# Patient Record
Sex: MALE | Race: WHITE | Employment: UNEMPLOYED | URBAN - METROPOLITAN AREA
[De-identification: names, ages, dates, MRNs, and addresses within clinical notes are randomized per-mention and may not be internally consistent; named-entity substitution may affect disease eponyms.]

---

## 2020-06-20 ENCOUNTER — HOSPITAL ENCOUNTER (INPATIENT)
Age: 40
LOS: 1 days | Discharge: HOME | DRG: 194 | End: 2020-06-22
Attending: EMERGENCY MEDICINE | Admitting: HOSPITALIST
Payer: MEDICAID

## 2020-06-20 ENCOUNTER — APPOINTMENT (OUTPATIENT)
Dept: GENERAL RADIOLOGY | Age: 40
DRG: 194 | End: 2020-06-20
Attending: EMERGENCY MEDICINE
Payer: MEDICAID

## 2020-06-20 DIAGNOSIS — L03.115 CELLULITIS OF BOTH LOWER EXTREMITIES: ICD-10-CM

## 2020-06-20 DIAGNOSIS — Z72.0 TOBACCO ABUSE: ICD-10-CM

## 2020-06-20 DIAGNOSIS — L03.116 CELLULITIS OF BOTH LOWER EXTREMITIES: ICD-10-CM

## 2020-06-20 DIAGNOSIS — I50.9 ACUTE ON CHRONIC CONGESTIVE HEART FAILURE, UNSPECIFIED HEART FAILURE TYPE (HCC): Primary | ICD-10-CM

## 2020-06-20 DIAGNOSIS — Z91.199 HISTORY OF NONCOMPLIANCE WITH MEDICAL TREATMENT: ICD-10-CM

## 2020-06-20 DIAGNOSIS — R60.9 PERIPHERAL EDEMA: ICD-10-CM

## 2020-06-20 DIAGNOSIS — J90 PLEURAL EFFUSION: ICD-10-CM

## 2020-06-20 DIAGNOSIS — Z91.199 MEDICAL NON-COMPLIANCE: ICD-10-CM

## 2020-06-20 DIAGNOSIS — F15.10 METHAMPHETAMINE ABUSE (HCC): ICD-10-CM

## 2020-06-20 DIAGNOSIS — I50.21 ACUTE SYSTOLIC CHF (CONGESTIVE HEART FAILURE) (HCC): ICD-10-CM

## 2020-06-20 LAB
ALBUMIN SERPL-MCNC: 2.9 G/DL (ref 3.5–5)
ALBUMIN/GLOB SERPL: 0.6 {RATIO} (ref 1.2–3.5)
ALP SERPL-CCNC: 166 U/L (ref 50–136)
ALT SERPL-CCNC: 14 U/L (ref 12–65)
ANION GAP SERPL CALC-SCNC: 8 MMOL/L (ref 7–16)
AST SERPL-CCNC: 25 U/L (ref 15–37)
BASOPHILS # BLD: 0.1 K/UL (ref 0–0.2)
BASOPHILS NFR BLD: 1 % (ref 0–2)
BILIRUB SERPL-MCNC: 1.9 MG/DL (ref 0.2–1.1)
BNP SERPL-MCNC: 8814 PG/ML (ref 5–125)
BUN SERPL-MCNC: 15 MG/DL (ref 6–23)
CALCIUM SERPL-MCNC: 8.6 MG/DL (ref 8.3–10.4)
CHLORIDE SERPL-SCNC: 105 MMOL/L (ref 98–107)
CO2 SERPL-SCNC: 27 MMOL/L (ref 21–32)
CREAT SERPL-MCNC: 1.22 MG/DL (ref 0.8–1.5)
DIFFERENTIAL METHOD BLD: ABNORMAL
EOSINOPHIL # BLD: 0 K/UL (ref 0–0.8)
EOSINOPHIL NFR BLD: 0 % (ref 0.5–7.8)
ERYTHROCYTE [DISTWIDTH] IN BLOOD BY AUTOMATED COUNT: 18.5 % (ref 11.9–14.6)
GLOBULIN SER CALC-MCNC: 4.6 G/DL (ref 2.3–3.5)
GLUCOSE SERPL-MCNC: 95 MG/DL (ref 65–100)
HCT VFR BLD AUTO: 42.9 % (ref 41.1–50.3)
HGB BLD-MCNC: 13.2 G/DL (ref 13.6–17.2)
IMM GRANULOCYTES # BLD AUTO: 0 K/UL (ref 0–0.5)
IMM GRANULOCYTES NFR BLD AUTO: 0 % (ref 0–5)
LYMPHOCYTES # BLD: 1.6 K/UL (ref 0.5–4.6)
LYMPHOCYTES NFR BLD: 22 % (ref 13–44)
MAGNESIUM SERPL-MCNC: 1.8 MG/DL (ref 1.8–2.4)
MCH RBC QN AUTO: 27.8 PG (ref 26.1–32.9)
MCHC RBC AUTO-ENTMCNC: 30.8 G/DL (ref 31.4–35)
MCV RBC AUTO: 90.3 FL (ref 79.6–97.8)
MONOCYTES # BLD: 0.8 K/UL (ref 0.1–1.3)
MONOCYTES NFR BLD: 11 % (ref 4–12)
NEUTS SEG # BLD: 4.7 K/UL (ref 1.7–8.2)
NEUTS SEG NFR BLD: 65 % (ref 43–78)
NRBC # BLD: 0 K/UL (ref 0–0.2)
PLATELET # BLD AUTO: 209 K/UL (ref 150–450)
PMV BLD AUTO: 10.1 FL (ref 9.4–12.3)
POTASSIUM SERPL-SCNC: 3.7 MMOL/L (ref 3.5–5.1)
PROT SERPL-MCNC: 7.5 G/DL (ref 6.3–8.2)
RBC # BLD AUTO: 4.75 M/UL (ref 4.23–5.6)
SODIUM SERPL-SCNC: 140 MMOL/L (ref 136–145)
WBC # BLD AUTO: 7.2 K/UL (ref 4.3–11.1)

## 2020-06-20 PROCEDURE — 74011250636 HC RX REV CODE- 250/636: Performed by: EMERGENCY MEDICINE

## 2020-06-20 PROCEDURE — 71045 X-RAY EXAM CHEST 1 VIEW: CPT

## 2020-06-20 PROCEDURE — 80053 COMPREHEN METABOLIC PANEL: CPT

## 2020-06-20 PROCEDURE — 83735 ASSAY OF MAGNESIUM: CPT

## 2020-06-20 PROCEDURE — 99285 EMERGENCY DEPT VISIT HI MDM: CPT

## 2020-06-20 PROCEDURE — 83880 ASSAY OF NATRIURETIC PEPTIDE: CPT

## 2020-06-20 PROCEDURE — 83605 ASSAY OF LACTIC ACID: CPT

## 2020-06-20 PROCEDURE — 85025 COMPLETE CBC W/AUTO DIFF WBC: CPT

## 2020-06-20 PROCEDURE — 93005 ELECTROCARDIOGRAM TRACING: CPT | Performed by: EMERGENCY MEDICINE

## 2020-06-20 PROCEDURE — 84145 PROCALCITONIN (PCT): CPT

## 2020-06-20 RX ORDER — FUROSEMIDE 10 MG/ML
80 INJECTION INTRAMUSCULAR; INTRAVENOUS
Status: COMPLETED | OUTPATIENT
Start: 2020-06-20 | End: 2020-06-20

## 2020-06-20 RX ADMIN — FUROSEMIDE 80 MG: 10 INJECTION, SOLUTION INTRAMUSCULAR; INTRAVENOUS at 23:53

## 2020-06-21 PROBLEM — F15.10 METHAMPHETAMINE ABUSE (HCC): Status: ACTIVE | Noted: 2020-06-21

## 2020-06-21 PROBLEM — L03.115 CELLULITIS OF BOTH LOWER EXTREMITIES: Status: ACTIVE | Noted: 2020-06-21

## 2020-06-21 PROBLEM — Z72.0 TOBACCO ABUSE: Status: ACTIVE | Noted: 2020-06-21

## 2020-06-21 PROBLEM — L03.116 CELLULITIS OF BOTH LOWER EXTREMITIES: Status: ACTIVE | Noted: 2020-06-21

## 2020-06-21 PROBLEM — B18.2 HEPATITIS C, CHRONIC (HCC): Status: ACTIVE | Noted: 2020-06-21

## 2020-06-21 PROBLEM — I10 HTN (HYPERTENSION), MALIGNANT: Status: ACTIVE | Noted: 2020-06-21

## 2020-06-21 PROBLEM — J90 PLEURAL EFFUSION: Status: ACTIVE | Noted: 2020-06-21

## 2020-06-21 PROBLEM — I50.21 ACUTE SYSTOLIC CHF (CONGESTIVE HEART FAILURE) (HCC): Status: ACTIVE | Noted: 2020-06-21

## 2020-06-21 PROBLEM — L03.311 CELLULITIS OF ABDOMINAL WALL: Status: ACTIVE | Noted: 2020-06-21

## 2020-06-21 PROBLEM — R60.9 PERIPHERAL EDEMA: Status: ACTIVE | Noted: 2020-06-21

## 2020-06-21 PROBLEM — Z91.199 MEDICAL NON-COMPLIANCE: Status: ACTIVE | Noted: 2020-06-21

## 2020-06-21 LAB
AMPHET UR QL SCN: POSITIVE
APPEARANCE UR: CLEAR
ATRIAL RATE: 127 BPM
BACTERIA SPEC CULT: ABNORMAL
BACTERIA SPEC CULT: ABNORMAL
BACTERIA URNS QL MICRO: 0 /HPF
BARBITURATES UR QL SCN: NEGATIVE
BENZODIAZ UR QL: POSITIVE
BILIRUB UR QL: NEGATIVE
CALCULATED P AXIS, ECG09: 115 DEGREES
CALCULATED R AXIS, ECG10: 114 DEGREES
CALCULATED T AXIS, ECG11: -47 DEGREES
CANNABINOIDS UR QL SCN: NEGATIVE
CASTS URNS QL MICRO: ABNORMAL /LPF
COCAINE UR QL SCN: NEGATIVE
COLOR UR: YELLOW
CRP SERPL-MCNC: 2.1 MG/DL (ref 0–0.9)
DIAGNOSIS, 93000: NORMAL
EPI CELLS #/AREA URNS HPF: 0 /HPF
GLUCOSE UR STRIP.AUTO-MCNC: NEGATIVE MG/DL
HGB UR QL STRIP: NEGATIVE
KETONES UR QL STRIP.AUTO: NEGATIVE MG/DL
LACTATE SERPL-SCNC: 3 MMOL/L (ref 0.4–2)
LEUKOCYTE ESTERASE UR QL STRIP.AUTO: NEGATIVE
METHADONE UR QL: NEGATIVE
NITRITE UR QL STRIP.AUTO: NEGATIVE
OPIATES UR QL: NEGATIVE
P-R INTERVAL, ECG05: 148 MS
PCP UR QL: NEGATIVE
PH UR STRIP: 6 [PH] (ref 5–9)
PROCALCITONIN SERPL-MCNC: <0.05 NG/ML
PROT UR STRIP-MCNC: ABNORMAL MG/DL
Q-T INTERVAL, ECG07: 308 MS
QRS DURATION, ECG06: 82 MS
QTC CALCULATION (BEZET), ECG08: 435 MS
RBC #/AREA URNS HPF: ABNORMAL /HPF
SERVICE CMNT-IMP: ABNORMAL
SP GR UR REFRACTOMETRY: 1.01 (ref 1–1.02)
UROBILINOGEN UR QL STRIP.AUTO: 1 EU/DL (ref 0.2–1)
VENTRICULAR RATE, ECG03: 120 BPM
WBC URNS QL MICRO: ABNORMAL /HPF

## 2020-06-21 PROCEDURE — 80307 DRUG TEST PRSMV CHEM ANLYZR: CPT

## 2020-06-21 PROCEDURE — 87040 BLOOD CULTURE FOR BACTERIA: CPT

## 2020-06-21 PROCEDURE — 99254 IP/OBS CNSLTJ NEW/EST MOD 60: CPT | Performed by: INTERNAL MEDICINE

## 2020-06-21 PROCEDURE — 87641 MR-STAPH DNA AMP PROBE: CPT

## 2020-06-21 PROCEDURE — 74011250636 HC RX REV CODE- 250/636: Performed by: HOSPITALIST

## 2020-06-21 PROCEDURE — 99223 1ST HOSP IP/OBS HIGH 75: CPT | Performed by: INTERNAL MEDICINE

## 2020-06-21 PROCEDURE — 65660000000 HC RM CCU STEPDOWN

## 2020-06-21 PROCEDURE — 36415 COLL VENOUS BLD VENIPUNCTURE: CPT

## 2020-06-21 PROCEDURE — 74011250637 HC RX REV CODE- 250/637: Performed by: HOSPITALIST

## 2020-06-21 PROCEDURE — 81003 URINALYSIS AUTO W/O SCOPE: CPT

## 2020-06-21 PROCEDURE — 86140 C-REACTIVE PROTEIN: CPT

## 2020-06-21 RX ORDER — METOPROLOL SUCCINATE 25 MG/1
25 TABLET, EXTENDED RELEASE ORAL DAILY
Status: DISCONTINUED | OUTPATIENT
Start: 2020-06-21 | End: 2020-06-22 | Stop reason: HOSPADM

## 2020-06-21 RX ORDER — GUAIFENESIN 100 MG/5ML
81 LIQUID (ML) ORAL DAILY
Status: DISCONTINUED | OUTPATIENT
Start: 2020-06-21 | End: 2020-06-22

## 2020-06-21 RX ORDER — HYDRALAZINE HYDROCHLORIDE 20 MG/ML
20 INJECTION INTRAMUSCULAR; INTRAVENOUS ONCE
Status: ACTIVE | OUTPATIENT
Start: 2020-06-21 | End: 2020-06-21

## 2020-06-21 RX ORDER — AMIODARONE HYDROCHLORIDE 200 MG/1
200 TABLET ORAL DAILY
Status: DISCONTINUED | OUTPATIENT
Start: 2020-06-21 | End: 2020-06-22 | Stop reason: HOSPADM

## 2020-06-21 RX ORDER — HEPARIN SODIUM 5000 [USP'U]/ML
5000 INJECTION, SOLUTION INTRAVENOUS; SUBCUTANEOUS EVERY 8 HOURS
Status: DISCONTINUED | OUTPATIENT
Start: 2020-06-21 | End: 2020-06-22 | Stop reason: HOSPADM

## 2020-06-21 RX ORDER — FUROSEMIDE 10 MG/ML
40 INJECTION INTRAMUSCULAR; INTRAVENOUS 2 TIMES DAILY
Status: DISCONTINUED | OUTPATIENT
Start: 2020-06-21 | End: 2020-06-22 | Stop reason: HOSPADM

## 2020-06-21 RX ORDER — SPIRONOLACTONE 25 MG/1
25 TABLET ORAL DAILY
Status: DISCONTINUED | OUTPATIENT
Start: 2020-06-21 | End: 2020-06-22 | Stop reason: HOSPADM

## 2020-06-21 RX ORDER — CLINDAMYCIN PHOSPHATE 900 MG/50ML
900 INJECTION INTRAVENOUS EVERY 8 HOURS
Status: DISCONTINUED | OUTPATIENT
Start: 2020-06-21 | End: 2020-06-22

## 2020-06-21 RX ORDER — ACETAMINOPHEN 325 MG/1
650 TABLET ORAL
Status: DISCONTINUED | OUTPATIENT
Start: 2020-06-21 | End: 2020-06-22 | Stop reason: HOSPADM

## 2020-06-21 RX ORDER — LISINOPRIL 5 MG/1
10 TABLET ORAL DAILY
Status: DISCONTINUED | OUTPATIENT
Start: 2020-06-21 | End: 2020-06-22 | Stop reason: HOSPADM

## 2020-06-21 RX ORDER — ONDANSETRON 2 MG/ML
4 INJECTION INTRAMUSCULAR; INTRAVENOUS
Status: DISCONTINUED | OUTPATIENT
Start: 2020-06-21 | End: 2020-06-22 | Stop reason: HOSPADM

## 2020-06-21 RX ADMIN — METOPROLOL SUCCINATE 25 MG: 25 TABLET, EXTENDED RELEASE ORAL at 08:58

## 2020-06-21 RX ADMIN — HEPARIN SODIUM 5000 UNITS: 5000 INJECTION INTRAVENOUS; SUBCUTANEOUS at 14:07

## 2020-06-21 RX ADMIN — CLINDAMYCIN IN 5 PERCENT DEXTROSE 900 MG: 18 INJECTION, SOLUTION INTRAVENOUS at 09:32

## 2020-06-21 RX ADMIN — CLINDAMYCIN IN 5 PERCENT DEXTROSE 900 MG: 18 INJECTION, SOLUTION INTRAVENOUS at 02:57

## 2020-06-21 RX ADMIN — SPIRONOLACTONE 25 MG: 25 TABLET ORAL at 08:58

## 2020-06-21 RX ADMIN — FUROSEMIDE 40 MG: 10 INJECTION, SOLUTION INTRAMUSCULAR; INTRAVENOUS at 17:13

## 2020-06-21 RX ADMIN — FUROSEMIDE 40 MG: 10 INJECTION, SOLUTION INTRAMUSCULAR; INTRAVENOUS at 08:58

## 2020-06-21 RX ADMIN — ASPIRIN 81 MG 81 MG: 81 TABLET ORAL at 08:58

## 2020-06-21 RX ADMIN — CLINDAMYCIN IN 5 PERCENT DEXTROSE 900 MG: 18 INJECTION, SOLUTION INTRAVENOUS at 17:13

## 2020-06-21 RX ADMIN — AMIODARONE HYDROCHLORIDE 200 MG: 200 TABLET ORAL at 08:58

## 2020-06-21 RX ADMIN — HEPARIN SODIUM 5000 UNITS: 5000 INJECTION INTRAVENOUS; SUBCUTANEOUS at 23:41

## 2020-06-21 RX ADMIN — Medication 1 AMPULE: at 20:54

## 2020-06-21 RX ADMIN — ACETAMINOPHEN 650 MG: 325 TABLET, FILM COATED ORAL at 20:53

## 2020-06-21 RX ADMIN — Medication 1 AMPULE: at 08:58

## 2020-06-21 RX ADMIN — HEPARIN SODIUM 5000 UNITS: 5000 INJECTION INTRAVENOUS; SUBCUTANEOUS at 06:01

## 2020-06-21 RX ADMIN — LISINOPRIL 10 MG: 5 TABLET ORAL at 08:58

## 2020-06-21 NOTE — CONSULTS
[address] Cardiology Consult                Date of  Admission: 6/20/2020 11:08 PM     Primary Care Physician:  None  Primary Cardiologist:  [facility]  Referring Physician:  Dr. Laureen Salvador Physician:  Dr. Thony Aquino     CC/Reason for consult:  HFrEF management      Sheeba Ramos is a 42 y.o. male with PMHx dilated cardiomyopathy secondary to drug abuse with an EF 20-25%, medical non-compliance, hepatitis C, MRSA, cardiac arrest/Vfib Dec 2019 (signed out AMA after this admission) , umbilical hernia, and polysubstance abuse (meth, benzos, heroine, THC), who presents with worsening shortness of breath and LE edema. He was in [facility] for same symptoms and signed out AMA on 6/18/2020. In the ED, he reported to smoking meth the day prior to admission, because he could not find a vein to inject because he was too swollen. In the ED, labs showed WBC 7.2, H&H 13.2/42.9, plt 209, , k 3.7, pBNP 8814, BUN 15 and creatinine 1.22. CXR with pulmonary edema and moderate R pleural effusion. UDS +amphetamines and benzos. LE with pitting edema and cellulitic appearing. He was admitted for IV diuresis. Review of the chart shows multiple admissions to [facility]. Last echo @ [facility] 3/6/2020 showed EF 20-25%. No ischemic work up has been performed secondary to long standing non compliance with medical therapy. Paracentesis was attempted but patient reports they stopped due to his pain. [facility] notes would consider ICD work up if he showed compliance, but unfortunately he has not shown compliance. Past Medical History:   Diagnosis Date    Cardiac arrest ([facility] [address]     CKD (chronic kidney disease)     ETOH abuse     Heart failure with reduced ejection fraction (HCC)     Hepatitis C     MRSA carrier     [facility]       History reviewed. No pertinent surgical history. No Known Allergies   History reviewed. No pertinent family history.      Current Facility-Administered Medications   Medication Dose Route Frequency    amiodarone (CORDARONE) tablet 200 mg  200 mg Oral DAILY    aspirin chewable tablet 81 mg  81 mg Oral DAILY    lisinopriL (PRINIVIL, ZESTRIL) tablet 10 mg  10 mg Oral DAILY    metoprolol succinate (TOPROL-XL) XL tablet 25 mg  25 mg Oral DAILY    spironolactone (ALDACTONE) tablet 25 mg  25 mg Oral DAILY    hydrALAZINE (APRESOLINE) 20 mg/mL injection 20 mg  20 mg IntraVENous ONCE    clindamycin (CLEOCIN) 900mg D5W 50mL IVPB (premix)  900 mg IntraVENous Q8H    ondansetron (ZOFRAN) injection 4 mg  4 mg IntraVENous Q6H PRN    acetaminophen (TYLENOL) tablet 650 mg  650 mg Oral Q6H PRN    furosemide (LASIX) injection 40 mg  40 mg IntraVENous BID    heparin (porcine) injection 5,000 Units  5,000 Units SubCUTAneous Q8H    alcohol 62% (NOZIN) nasal  1 Ampule  1 Ampule Topical Q12H       Review of Systems   Constitutional: Negative. HENT: Negative. Eyes: Negative. Respiratory: Positive for shortness of breath. Cardiovascular: Positive for leg swelling. Gastrointestinal: Negative. Genitourinary: Negative. Skin: Negative. Neurological: Negative. Endo/Heme/Allergies: Negative. Psychiatric/Behavioral: Positive for substance abuse. Physical Exam  Vitals:    06/21/20 0119 06/21/20 0135 06/21/20 0454 06/21/20 0811   BP: 125/90 (!) 124/93 113/84 106/77   Pulse: (!) 121 (!) 122 (!) 112 (!) 118   Resp: 17  18 16   Temp:  98.2 °F (36.8 °C) 98.7 °F (37.1 °C) 98.1 °F (36.7 °C)   SpO2: 96% 100% 96% 93%   Weight:  99.3 kg (218 lb 14.4 oz)     Height:  6' 1\" (1.854 m)         Physical Exam:  Physical Exam  Constitutional:       Appearance: He is ill-appearing. Eyes:      Pupils: Pupils are equal, round, and reactive to light. Cardiovascular:      Rate and Rhythm: Normal rate and regular rhythm. Pulmonary:      Breath sounds: Examination of the right-middle field reveals decreased breath sounds.  Examination of the right-lower field reveals decreased breath sounds. Decreased breath sounds present. Abdominal:      General: Bowel sounds are normal. There is distension. Tenderness: There is abdominal tenderness. Musculoskeletal:      Right lower le+ Pitting Edema present. Left lower le+ Pitting Edema present. Skin:     Findings: Erythema present. Neurological:      General: No focal deficit present. Mental Status: He is alert and oriented to person, place, and time. Psychiatric:         Mood and Affect: Mood normal.         Behavior: Behavior normal.         Thought Content: Thought content normal.         Judgment: Judgment normal.         Cardiographics    Telemetry: normal sinus rhythm  ECG: there are no previous tracings available for comparison, normal sinus rhythm, nonspecific ST and T waves changes  Echocardiogram: pending    Labs:   Recent Labs     20  2309      K 3.7   MG 1.8   BUN 15   CREA 1.22   GLU 95   WBC 7.2   HGB 13.2*   HCT 42.9           Assessment/Plan:     Assessment:      Principal Problem:    Acute systolic CHF (congestive heart failure) -- continue BB and ACe. IVP lasix. Strict I/O and daily weights. Repeat echo. Active Problems:    Medical non-compliance -- stressed need for compliance and cessation of drug use. HTN (hypertension), malignant -- stable on current meds. Methamphetamine abuse -- discussed need for cessation      Tobacco abuse -- cessation needed       Peripheral edema -- see above      Cellulitis of abdominal wall -- on abx      Hepatitis C, chronic       Pleural effusion -- pulmonary following, pulmonary to evaluate for possible thoracentesis tomorrow. Cellulitis of both lower extremities - on abx          Thank you very much for this referral. We appreciate the opportunity to participate in this patient's care. We will follow along with above stated plan.     Taurus Smalls NP  Consulting MD: Yoshi Simon

## 2020-06-21 NOTE — ROUTINE PROCESS
Bedside and Verbal shift change report received from [facility] (offgoing nurse). Report included the following information SBAR, Kardex, ED Summary, Procedure Summary, Recent Results and Med Rec Status. Opportunity for questions provided.

## 2020-06-21 NOTE — PROGRESS NOTES
Hospitalist Note     Admit Date:  2020 11:08 PM   Name:  Sukhi Azevedo   Age:  42 y.o.  :  1980   MRN:  [id number]   PCP:  None  Treatment Team: Attending Provider: Olaf Garvey MD; Consulting Provider: Nia Cardona MD; Primary Nurse: Dipak Carney; Consulting Provider: Gregorio Redman DO    HPI/Subjective:   Mr. Roberto Fry is a 41 y/o WM with a h/o drug-induced CM, non-compliance, polysubstance abuse who was recently hospitalized at [facility] for acute sCHF. He signed out AMA and eventually found his way to [facility] ER. CXR with congestion. Peripheral edema, some concern for cellulitis given erythema. Admitted for acute on chronic sCHF.    : In bed, female friend present. Says he has lots of edema. 1L out since admission. No other complaints  Objective:     Patient Vitals for the past 24 hrs:   Temp Pulse Resp BP SpO2   20 1220 98.3 °F (36.8 °C) (!) 110 17 111/90 93 %   20 0811 98.1 °F (36.7 °C) (!) 118 16 106/77 93 %   20 0454 98.7 °F (37.1 °C) (!) 112 18 113/84 96 %   20 0135 98.2 °F (36.8 °C) (!) 122  (!) 124/93 100 %   20 0119  (!) 121 17 125/90 96 %   20 2359  (!) 124  (!) 142/95 98 %   20 2353  (!) 123  (!) 141/106    20 2340  (!) 124  (!) 141/106 100 %   20 2332  (!) 124  (!) 128/103    20 2316  (!) 120  (!) 130/99 100 %   20 2300 98.2 °F (36.8 °C) (!) 125 24 (!) 132/91 98 %     Oxygen Therapy  O2 Sat (%): 93 % (20 1220)  Pulse via Oximetry: 125 beats per minute (20 0119)  O2 Device: Room air (20 0797)    Estimated body mass index is 28.88 kg/m² as calculated from the following:    Height as of this encounter: 6' 1\" (1.854 m). Weight as of this encounter: 99.3 kg (218 lb 14.4 oz).       Intake/Output Summary (Last 24 hours) at 2020 1428  Last data filed at 2020 0300  Gross per 24 hour   Intake    Output 975 ml   Net -975 ml       *Note that automatically entered I/Os may not be accurate; dependent on patient compliance with collection and accurate  by techs. General:    Well nourished. Alert. Appears older than stated age. CV:   RRR. No murmur, rub, or gallop. Lungs:   Rales on the left. Decreased BS at the right base. Clear upper fields. RA O2. NAD. Abdomen:   Soft, nontender, nondistended. Erythema to lower portion with associated edema. No rashes or bruising. Extremities: Warm and dry. No cyanosis. 3+ edema to knees and involving lateral thighs. Erythema to LEs, mostly the feet. Skin:     No rashes or jaundice. Neuro:  No gross focal deficits    Data Review:  I have reviewed all labs, meds, and studies from the last 24 hours:  Recent Results (from the past 24 hour(s))   CBC WITH AUTOMATED DIFF    Collection Time: 06/20/20 11:09 PM   Result Value Ref Range    WBC 7.2 4.3 - 11.1 K/uL    RBC 4.75 4.23 - 5.6 M/uL    HGB 13.2 (L) 13.6 - 17.2 g/dL    HCT 42.9 41.1 - 50.3 %    MCV 90.3 79.6 - 97.8 FL    MCH 27.8 26.1 - 32.9 PG    MCHC 30.8 (L) 31.4 - 35.0 g/dL    RDW 18.5 (H) 11.9 - 14.6 %    PLATELET 848 437 - 450 K/uL    MPV 10.1 9.4 - 12.3 FL    ABSOLUTE NRBC 0.00 0.0 - 0.2 K/uL    DF AUTOMATED      NEUTROPHILS 65 43 - 78 %    LYMPHOCYTES 22 13 - 44 %    MONOCYTES 11 4.0 - 12.0 %    EOSINOPHILS 0 (L) 0.5 - 7.8 %    BASOPHILS 1 0.0 - 2.0 %    IMMATURE GRANULOCYTES 0 0.0 - 5.0 %    ABS. NEUTROPHILS 4.7 1.7 - 8.2 K/UL    ABS. LYMPHOCYTES 1.6 0.5 - 4.6 K/UL    ABS. MONOCYTES 0.8 0.1 - 1.3 K/UL    ABS. EOSINOPHILS 0.0 0.0 - 0.8 K/UL    ABS. BASOPHILS 0.1 0.0 - 0.2 K/UL    ABS. IMM.  GRANS. 0.0 0.0 - 0.5 K/UL   METABOLIC PANEL, COMPREHENSIVE    Collection Time: 06/20/20 11:09 PM   Result Value Ref Range    Sodium 140 136 - 145 mmol/L    Potassium 3.7 3.5 - 5.1 mmol/L    Chloride 105 98 - 107 mmol/L    CO2 27 21 - 32 mmol/L    Anion gap 8 7 - 16 mmol/L    Glucose 95 65 - 100 mg/dL    BUN 15 6 - 23 MG/DL    Creatinine 1.22 0.8 - 1.5 MG/DL    GFR est AA >60 >60 ml/min/1.73m2    GFR est non-AA >60 >60 ml/min/1.73m2    Calcium 8.6 8.3 - 10.4 MG/DL    Bilirubin, total 1.9 (H) 0.2 - 1.1 MG/DL    ALT (SGPT) 14 12 - 65 U/L    AST (SGOT) 25 15 - 37 U/L    Alk. phosphatase 166 (H) 50 - 136 U/L    Protein, total 7.5 6.3 - 8.2 g/dL    Albumin 2.9 (L) 3.5 - 5.0 g/dL    Globulin 4.6 (H) 2.3 - 3.5 g/dL    A-G Ratio 0.6 (L) 1.2 - 3.5     MAGNESIUM    Collection Time: 06/20/20 11:09 PM   Result Value Ref Range    Magnesium 1.8 1.8 - 2.4 mg/dL   NT-PRO BNP    Collection Time: 06/20/20 11:09 PM   Result Value Ref Range    NT pro-BNP 8,814 (H) 5 - 125 PG/ML   PROCALCITONIN    Collection Time: 06/20/20 11:09 PM   Result Value Ref Range    Procalcitonin <0.05 ng/mL   EKG, 12 LEAD, INITIAL    Collection Time: 06/20/20 11:16 PM   Result Value Ref Range    Ventricular Rate 120 BPM    Atrial Rate 127 BPM    P-R Interval 148 ms    QRS Duration 82 ms    Q-T Interval 308 ms    QTC Calculation (Bezet) 435 ms    Calculated P Axis 115 degrees    Calculated R Axis 114 degrees    Calculated T Axis -47 degrees    Diagnosis       !!! Suspect arm lead reversal, interpretation assumes no reversal  !! AGE AND GENDER SPECIFIC ECG ANALYSIS !!   Sinus tachycardia  Septal infarct , age undetermined  Lateral infarct , age undetermined  ST & T wave abnormality, consider inferior ischemia  Abnormal ECG  No previous ECGs available  Confirmed by Devorah Blackwell ([id number]) on 6/21/2020 9:34:23 AM     LACTIC ACID    Collection Time: 06/20/20 11:21 PM   Result Value Ref Range    Lactic acid 3.0 (HH) 0.4 - 2.0 MMOL/L   URINALYSIS W/ RFLX MICROSCOPIC    Collection Time: 06/21/20 12:32 AM   Result Value Ref Range    Color YELLOW      Appearance CLEAR      Specific gravity 1.011 1.001 - 1.023      pH (UA) 6.0 5.0 - 9.0      Protein TRACE (A) NEG mg/dL    Glucose Negative mg/dL    Ketone Negative NEG mg/dL    Bilirubin Negative NEG      Blood Negative NEG      Urobilinogen 1.0 0.2 - 1.0 EU/dL    Nitrites Negative NEG Leukocyte Esterase Negative NEG      WBC 0-3 0 /hpf    RBC 0-3 0 /hpf    Epithelial cells 0 0 /hpf    Bacteria 0 0 /hpf    Casts 0-3 0 /lpf   DRUG SCREEN, URINE    Collection Time: 06/21/20 12:32 AM   Result Value Ref Range    PCP(PHENCYCLIDINE) Negative      BENZODIAZEPINES Positive      COCAINE Negative      AMPHETAMINES Positive      METHADONE Negative      THC (TH-CANNABINOL) Negative      OPIATES Negative      BARBITURATES Negative     C REACTIVE PROTEIN, QT    Collection Time: 06/21/20  3:02 AM   Result Value Ref Range    C-Reactive protein 2.1 (H) 0.0 - 0.9 mg/dL   MSSA/MRSA SC BY PCR, NASAL SWAB    Collection Time: 06/21/20  3:22 AM   Result Value Ref Range    Special Requests: NO SPECIAL REQUESTS      Culture result: (A)       MRSA target DNA detected, SA target DNA detected. A positive test result does not necessarily indicate the presence of viable organisms. It is however, presumptive for the presence of MRSA or SA.     Culture result:        RESULTS VERIFIED, PHONED TO AND READ BACK BY  ANNIE AT 0609 ON 6.21.20            Current Meds:  Current Facility-Administered Medications   Medication Dose Route Frequency    amiodarone (CORDARONE) tablet 200 mg  200 mg Oral DAILY    aspirin chewable tablet 81 mg  81 mg Oral DAILY    lisinopriL (PRINIVIL, ZESTRIL) tablet 10 mg  10 mg Oral DAILY    metoprolol succinate (TOPROL-XL) XL tablet 25 mg  25 mg Oral DAILY    spironolactone (ALDACTONE) tablet 25 mg  25 mg Oral DAILY    clindamycin (CLEOCIN) 900mg D5W 50mL IVPB (premix)  900 mg IntraVENous Q8H    ondansetron (ZOFRAN) injection 4 mg  4 mg IntraVENous Q6H PRN    acetaminophen (TYLENOL) tablet 650 mg  650 mg Oral Q6H PRN    furosemide (LASIX) injection 40 mg  40 mg IntraVENous BID    heparin (porcine) injection 5,000 Units  5,000 Units SubCUTAneous Q8H    alcohol 62% (NOZIN) nasal  1 Ampule  1 Ampule Topical Q12H       Other Studies:  No results found for this visit on 06/20/20. Xr Chest Port    Result Date: 6/20/2020  EXAM: Chest x-ray. INDICATION: Dyspnea. COMPARISON: None. TECHNIQUE: Frontal view chest x-ray. FINDINGS: The heart is enlarged. There is a moderate sized right pleural effusion, with atelectasis or infiltrate in the right lung base. There is also linear atelectasis or scarring in the left lung base. No pneumothorax or left pleural effusion is seen. There are old right-sided rib fractures. IMPRESSION: 1. Cardiomegaly. 2. Right lung base atelectasis or infiltrate and moderate pleural effusion. All Micro Results     Procedure Component Value Units Date/Time    MSSA/MRSA SC BY PCR, NASAL SWAB [[id number]]  (Abnormal) Collected:  06/21/20 0322    Order Status:  Completed Specimen:  Nasal swab Updated:  06/21/20 0609     Special Requests: NO SPECIAL REQUESTS        Culture result:       MRSA target DNA detected, SA target DNA detected. A positive test result does not necessarily indicate the presence of viable organisms.  It is however, presumptive for the presence of MRSA or SA.                  RESULTS VERIFIED, PHONED TO AND READ BACK BY  ANNIE AT [place] [id number] ON 6.21.20        CULTURE, BLOOD [[id number]] Collected:  06/21/20 0309    Order Status:  Completed Specimen:  Blood Updated:  06/21/20 0316    CULTURE, BLOOD [[id number]] Collected:  06/21/20 0302    Order Status:  Completed Specimen:  Blood Updated:  06/21/20 0316          SARS-CoV-2 Lab Results  \"Novel Coronavirus\" Test: No results found for: COV2NT   \"Emergent Disease\" Test: No results found for: EDPR  \"SARS-COV-2\" Test: No results found for: XGCOVT  As of: 2:28 PM on 6/21/2020        Assessment and Plan:     Hospital Problems as of 6/21/2020 Never Reviewed          Codes Class Noted - Resolved POA    Medical non-compliance ICD-10-CM: Z91.19  ICD-9-CM: V15.81  6/21/2020 - Present Yes        * (Principal) Acute systolic CHF (congestive heart failure) ([facility] [address] ICD-10-CM: I50.21  ICD-9-CM: 428.21, 428.0 6/21/2020 - Present Yes        HTN (hypertension), malignant ICD-10-CM: I10  ICD-9-CM: 401.0  6/21/2020 - Present Yes        Methamphetamine abuse ([address] ICD-10-CM: F15.10  ICD-9-CM: 305.70  6/21/2020 - Present Yes        Tobacco abuse ICD-10-CM: Z72.0  ICD-9-CM: 305.1  6/21/2020 - Present Yes        Peripheral edema ICD-10-CM: R60.9  ICD-9-CM: 782.3  6/21/2020 - Present Yes        Cellulitis of abdominal wall ICD-10-CM: L03.311  ICD-9-CM: 682.2  6/21/2020 - Present Yes        Hepatitis C, chronic (HCC) ICD-10-CM: B18.2  ICD-9-CM: 070.54  6/21/2020 - Present Yes        Pleural effusion ICD-10-CM: J90  ICD-9-CM: 511.9  6/21/2020 - Present Yes        Cellulitis of both lower extremities ICD-10-CM: L03.115, L03.116  ICD-9-CM: 682.6  6/21/2020 - Present Yes              Plan:  # Acute on chronic sCHF    - Drug-induced CM, UDS + BZDs, amphetamine   - IV Lasix, OMT   - H/o non-compliance (frequent hospitalizations noted and frequently leaves AMA). # Right pleural effusion   - Pulm to assess need for thora. RA O2. # Possible cellulitis   - Erythema to b/le LEs and abdominal wall, but also in the setting of significant edema. Ok to con't clinda for now. Will follow. # Polysubstance abuse   - UDS + benzos and amphetamines    DC planning/Dispo: Home when able.   Diet:  DIET CARDIAC  DVT ppx: SCDs    Signed:  Jose Ross MD

## 2020-06-21 NOTE — ED TRIAGE NOTES
Pt arrives from home, mask in place, able to ambulate to triage with cane, with c/o difficulty breathing. Pt has hx of CHF r/t polysubstance abuse and is non-compliant with medication. Pt asking if he can \"get a shot and go home\". Pt admits to smoking meth yesterday. Pt states, \"I know I'm dying, just can't leave the shit alone. \" Pt still smoking 0.5 PPD.

## 2020-06-21 NOTE — H&P
Hospitalist H&P/Consult Note     Admit Date:  2020 11:08 PM   Name:  Roxanna Mireles   Age:  42 y.o.  :  1980   MRN:  [id number]   PCP:  None  Treatment Team: Attending Provider: Mary Quinteros MD    HPI:   Patient is a 41 y/o male with hx dilated cardiomyopathy secondary to drug abuse with EF 20-25%, medical non-compliance, hepatitis C, cardiac arrest, V-fib, polysubstance abuse who presents to ED with worsening swelling in legs and shortness of breath. He just left [facility] AMA. Admits to using meth yesterday. He smoked it as he could not find a vein to inject because of the swelling. Now has redness of abdominal wall and legs that appears cellulitic. Has prior hx MRSA infection. He has a pro-BNP of 8814. Chest xray with right pleural effusion and bilateral pulmonary edema. He is quite hypertensive and tachycardic. Has marked edema of legs and abdominal wall. Received IV lasix in ED. No fever and he is not hypoxic. Doesn't really have a place to live right now. Will admit to telemetry for further care of acute systolic CHF. 10 systems reviewed and negative except as noted in HPI. No past medical history on file. systolic CHF with EF 47-86%, HTN, Hep C, MRSA, cardiac arrest, V-fib, anxiety, dental abscess, umbilical hernia  No past surgical history on file. myringotomy with tubes as child  None   Non-compliant with meds    No Known Allergies   Social History     Tobacco Use    Smoking status: Not on file   Substance Use Topics    Alcohol use: Not on file    hx methamphetamine abuse, alcohol, tobacco abuse    No family history on file. cancer father    There is no immunization history on file for this patient.     Objective:     Patient Vitals for the past 24 hrs:   Temp Pulse Resp BP SpO2   20 0135 98.2 °F (36.8 °C) (!) 122  (!) 124/93 100 %   20 0119  (!) 121 17 125/90 96 %   20 2359  (!) 124  (!) 142/95 98 %   20 2353  (!) 123  (!) 141/106  06/20/20 2340  (!) 124  (!) 141/106 100 %   06/20/20 2332  (!) 124  (!) 128/103    06/20/20 2316  (!) 120  (!) 130/99 100 %   06/20/20 2300 98.2 °F (36.8 °C) (!) 125 24 (!) 132/91 98 %     Oxygen Therapy  O2 Sat (%): 100 % (06/21/20 0135)  Pulse via Oximetry: 125 beats per minute (06/21/20 0119)  O2 Device: Room air (06/21/20 0135)    Intake/Output Summary (Last 24 hours) at 6/21/2020 0316  Last data filed at 6/21/2020 0300  Gross per 24 hour   Intake    Output 975 ml   Net -975 ml       Physical Exam:  General:    Disheveled appearance. Alert. Appears much older than his stated age  Eyes:   Normal sclera. Extraocular movements intact. ENT:  Normocephalic, atraumatic. Moist mucous membranes  Poor dentition. hypertensive  CV:   Tachycardic. Elevated JVD  Lungs:  Bilateral rales. Dullness right base  Abdomen: Subcutaneous edema. distended  Extremities: Warm and dry. Toes dusky. Marked LE edema  Neurologic: CN II-XII grossly intact. Sensation intact. Skin:     Cellulitis of abdomen and both legs  Psych:  Normal mood and affect. I reviewed the labs, imaging, EKGs, telemetry, and other studies done this admission. Data Review:   Recent Results (from the past 24 hour(s))   CBC WITH AUTOMATED DIFF    Collection Time: 06/20/20 11:09 PM   Result Value Ref Range    WBC 7.2 4.3 - 11.1 K/uL    RBC 4.75 4.23 - 5.6 M/uL    HGB 13.2 (L) 13.6 - 17.2 g/dL    HCT 42.9 41.1 - 50.3 %    MCV 90.3 79.6 - 97.8 FL    MCH 27.8 26.1 - 32.9 PG    MCHC 30.8 (L) 31.4 - 35.0 g/dL    RDW 18.5 (H) 11.9 - 14.6 %    PLATELET 099 945 - 128 K/uL    MPV 10.1 9.4 - 12.3 FL    ABSOLUTE NRBC 0.00 0.0 - 0.2 K/uL    DF AUTOMATED      NEUTROPHILS 65 43 - 78 %    LYMPHOCYTES 22 13 - 44 %    MONOCYTES 11 4.0 - 12.0 %    EOSINOPHILS 0 (L) 0.5 - 7.8 %    BASOPHILS 1 0.0 - 2.0 %    IMMATURE GRANULOCYTES 0 0.0 - 5.0 %    ABS. NEUTROPHILS 4.7 1.7 - 8.2 K/UL    ABS. LYMPHOCYTES 1.6 0.5 - 4.6 K/UL    ABS. MONOCYTES 0.8 0.1 - 1.3 K/UL    ABS. EOSINOPHILS 0.0 0.0 - 0.8 K/UL    ABS. BASOPHILS 0.1 0.0 - 0.2 K/UL    ABS. IMM. GRANS. 0.0 0.0 - 0.5 K/UL   METABOLIC PANEL, COMPREHENSIVE    Collection Time: 06/20/20 11:09 PM   Result Value Ref Range    Sodium 140 136 - 145 mmol/L    Potassium 3.7 3.5 - 5.1 mmol/L    Chloride 105 98 - 107 mmol/L    CO2 27 21 - 32 mmol/L    Anion gap 8 7 - 16 mmol/L    Glucose 95 65 - 100 mg/dL    BUN 15 6 - 23 MG/DL    Creatinine 1.22 0.8 - 1.5 MG/DL    GFR est AA >60 >60 ml/min/1.73m2    GFR est non-AA >60 >60 ml/min/1.73m2    Calcium 8.6 8.3 - 10.4 MG/DL    Bilirubin, total 1.9 (H) 0.2 - 1.1 MG/DL    ALT (SGPT) 14 12 - 65 U/L    AST (SGOT) 25 15 - 37 U/L    Alk.  phosphatase 166 (H) 50 - 136 U/L    Protein, total 7.5 6.3 - 8.2 g/dL    Albumin 2.9 (L) 3.5 - 5.0 g/dL    Globulin 4.6 (H) 2.3 - 3.5 g/dL    A-G Ratio 0.6 (L) 1.2 - 3.5     MAGNESIUM    Collection Time: 06/20/20 11:09 PM   Result Value Ref Range    Magnesium 1.8 1.8 - 2.4 mg/dL   NT-PRO BNP    Collection Time: 06/20/20 11:09 PM   Result Value Ref Range    NT pro-BNP 8,814 (H) 5 - 125 PG/ML   PROCALCITONIN    Collection Time: 06/20/20 11:09 PM   Result Value Ref Range    Procalcitonin <0.05 ng/mL   LACTIC ACID    Collection Time: 06/20/20 11:21 PM   Result Value Ref Range    Lactic acid 3.0 (HH) 0.4 - 2.0 MMOL/L   URINALYSIS W/ RFLX MICROSCOPIC    Collection Time: 06/21/20 12:32 AM   Result Value Ref Range    Color YELLOW      Appearance CLEAR      Specific gravity 1.011 1.001 - 1.023      pH (UA) 6.0 5.0 - 9.0      Protein TRACE (A) NEG mg/dL    Glucose Negative mg/dL    Ketone Negative NEG mg/dL    Bilirubin Negative NEG      Blood Negative NEG      Urobilinogen 1.0 0.2 - 1.0 EU/dL    Nitrites Negative NEG      Leukocyte Esterase Negative NEG      WBC 0-3 0 /hpf    RBC 0-3 0 /hpf    Epithelial cells 0 0 /hpf    Bacteria 0 0 /hpf    Casts 0-3 0 /lpf   DRUG SCREEN, URINE    Collection Time: 06/21/20 12:32 AM   Result Value Ref Range    PCP(PHENCYCLIDINE) Negative BENZODIAZEPINES Positive      COCAINE Negative      AMPHETAMINES Positive      METHADONE Negative      THC (TH-CANNABINOL) Negative      OPIATES Negative      BARBITURATES Negative         Imaging Studies:  CXR Results  (Last 48 hours)               06/20/20 2329  XR CHEST PORT Final result    Impression:  IMPRESSION:    1. Cardiomegaly. 2. Right lung base atelectasis or infiltrate and moderate pleural effusion. Narrative:  EXAM: Chest x-ray. INDICATION: Dyspnea. COMPARISON: None. TECHNIQUE: Frontal view chest x-ray. FINDINGS: The heart is enlarged. There is a moderate sized right pleural   effusion, with atelectasis or infiltrate in the right lung base. There is also   linear atelectasis or scarring in the left lung base. No pneumothorax or left   pleural effusion is seen. There are old right-sided rib fractures.                CT Results  (Last 48 hours)    None          Assessment and Plan:     Hospital Problems as of 6/21/2020 Never Reviewed          Codes Class Noted - Resolved POA    * (Principal) Acute systolic CHF (congestive heart failure) ([facility] ICD-10-CM: I50.21  ICD-9-CM: 428.21, 428.0  6/21/2020 - Present Yes        Medical non-compliance ICD-10-CM: Z91.19  ICD-9-CM: V15.81  6/21/2020 - Present Yes        HTN (hypertension), malignant ICD-10-CM: I10  ICD-9-CM: 401.0  6/21/2020 - Present Yes        Cellulitis of abdominal wall ICD-10-CM: L03.311  ICD-9-CM: 682.2  6/21/2020 - Present Yes        Methamphetamine abuse ([facility] ICD-10-CM: F15.10  ICD-9-CM: 305.70  6/21/2020 - Present Yes        Tobacco abuse ICD-10-CM: Z72.0  ICD-9-CM: 305.1  6/21/2020 - Present Yes        Peripheral edema ICD-10-CM: R60.9  ICD-9-CM: 782.3  6/21/2020 - Present Yes        Hepatitis C, chronic (HCC) ICD-10-CM: B18.2  ICD-9-CM: 070.54  6/21/2020 - Present Yes        Pleural effusion ICD-10-CM: J90  ICD-9-CM: 511.9  6/21/2020 - Present Yes        Cellulitis of both lower extremities ICD-10-CM: L03.115, L03.116  ICD-9-CM: 682.6  6/21/2020 - Present Yes              PLAN:  · Admit as inpatient to telemetry  · Patient's echo 3/20 at [facility] showed EF 20-25%  · He has hx cardiac arrest and V-fib  · CHF careset utilized. Supplemental O2 as needed  · Need to resume his medications  · On amiodarone, metoprolol and lisinopril  · Start diuresis with 40 mg lasix IV BID  · Daily BMP. Keep K above 4 and magnesium above 2  · Cardiac diet, ins/outs, low salt, fluid restrict  · UDS positive for methamphetamines-- abstinance  · He has prior hx MRSA--will check nasal MRSA  · Check blood cultures then start IV clindamycin for cellulitis of abdomen and legs  · Consult Pulmonary regarding right pleural effusion. May need thoracentesis  · Smoking cessation counseling  · Case management consult  · SQ heparin for DVT prophylaxis.  Incentive spirometry      Estimated LOS:  Greater than 2 midnights    Signed:  Andrew Lockhart MD

## 2020-06-21 NOTE — ROUTINE PROCESS
CHF teaching held ; sleeping soundly, Primary @ BS, will follow. Planners @ BS, will follow. Cardiac diet/ [facility]; on hold

## 2020-06-21 NOTE — H&P (VIEW-ONLY)
PULMONARY/CCM CONSULT :  6/21/2020 Date of Admission:  6/20/2020 The patient's chart has been reviewed and the chart has been discussed with nursing staff. Subjective: This patient has been seen and evaluated at the request of Dr. Starr Feldman. Patient is a 42 y.o. male presents with a PMHx dilated cardiomyopathy secondary to drug abuse with an EF 20-25%, medical non-compliance, hepatitis C, MRSA, cardiac arrest Dec 2019 (signed out AMA after this admission), V-fib, umbilical hernia, and polysubstance abuse. He just left [facility] AMA again and presented to the ED with worsening swelling in legs and SOB. Admits to using meth yesterday by smoking it, as he could not find a vein to inject it into due to swelling. He also is a current every day smoker with 1/2 ppd for the past 10 years. Admits to alcohol use of 5 cans of beer per week. Now has swelling of abdominal wall and legs that appears cellulitic. A paracentesis was attempted at [facility] 6/18/2020 with only 3 ml removed. Pt reports he did not tolerate procedure well and \"moved a lot\". Reviewing chart from [facility], has had bilateral effusions present since at least 11/2019, but no thoracentesis has been completed. He was intubated Dec 8 2019 r/t cardiac and extubated a  Few days later without complication. He then proceeded to sign out AMA. He does not report using any inhalers or bronchodilators. And reports taking his other meds at times, but admits to frequently missing them. This admission, Urine drug screen was positive for amphetamines and benzos. MRSA nasal swab positive as well. Pro-BNp 8,814, procal <0.05, LA 3.0, and C-reactive protein 2. 1. CXR showed right lung atelectasis and moderate pleural effusion with cardiomegaly. We were consulted for evaluation of his pleural effusion. He is currently on RA with a O2 sat of 93%. No past surgical history on file. Social History Tobacco Use  Smoking status: Not on file Substance Use Topics  Alcohol use: Not on file No family history on file. No Known Allergies None MEDS SCHEDULED:   
Current Facility-Administered Medications Medication Dose Route Frequency  amiodarone (CORDARONE) tablet 200 mg  200 mg Oral DAILY  aspirin chewable tablet 81 mg  81 mg Oral DAILY  lisinopriL (PRINIVIL, ZESTRIL) tablet 10 mg  10 mg Oral DAILY  metoprolol succinate (TOPROL-XL) XL tablet 25 mg  25 mg Oral DAILY  spironolactone (ALDACTONE) tablet 25 mg  25 mg Oral DAILY  hydrALAZINE (APRESOLINE) 20 mg/mL injection 20 mg  20 mg IntraVENous ONCE  
 clindamycin (CLEOCIN) 900mg D5W 50mL IVPB (premix)  900 mg IntraVENous Q8H  
 ondansetron (ZOFRAN) injection 4 mg  4 mg IntraVENous Q6H PRN  
 acetaminophen (TYLENOL) tablet 650 mg  650 mg Oral Q6H PRN  
 furosemide (LASIX) injection 40 mg  40 mg IntraVENous BID  heparin (porcine) injection 5,000 Units  5,000 Units SubCUTAneous Q8H  
 alcohol 62% (NOZIN) nasal  1 Ampule  1 Ampule Topical Q12H Review of Systems Pertinent items are noted in HPI. Objective:  
 
Vitals:  
 06/21/20 1567 06/21/20 0135 06/21/20 0454 06/21/20 5488 BP: 125/90 (!) 124/93 113/84 106/77 Pulse: (!) 121 (!) 122 (!) 112 (!) 118 Resp: 17  18 16 Temp:  98.2 °F (36.8 °C) 98.7 °F (37.1 °C) 98.1 °F (36.7 °C) SpO2: 96% 100% 96% 93% Weight:  218 lb 14.4 oz (99.3 kg) Height:  6' 1\" (1.854 m) No intake/output data recorded. 06/19 1901 - 06/21 0700 In: -  
Out: 975 Sandeep Copping PHYSICAL EXAM  
 
Physical Exam:  
General:  Alert, cooperative, no acute distress, appears stated age. Eyes:  Conjunctivae/corneas clear. Nose: Nares patent and moist.   
Mouth/Throat: Lips, mucosa, and tongue pink and intact. Neck: Supple, symmetrical.  
Respiratory:   Diminished RT lung, clear otherwise Cardiovascular:  Regular rate and rhythm, S1, S2, no murmur, click, rub or gallop.   Telemetry monitor: DANYELL  
 GI:   Abdomen semi-firm, tender, redness appears cellulitic. Bowel sounds active X 4 Q. Hiatal hernia noted, Musculoskeletal: Extremities symmetrical, atraumatic, tattoos noted, reddened and tight no cyanosis, 3+ pitting edema. Pulses: 2+ and symmetric all extremities. Skin: Skin color, texture, turgor normal. No rashes or lesions Neurologic: 2+ strength bilateral upper and lower extremities, sensation throughout appropriate. Alert and oriented. Slightly anxious CHEST X-RAYS: 
6/20/20 CULTURES: 
Results Procedure Component Value Units Date/Time MSSA/MRSA SC BY PCR, NASAL SWAB [[id number]]  (Abnormal) Collected:  06/21/20 0676 Order Status:  Completed Specimen:  Nasal swab Updated:  06/21/20 4825 Special Requests: NO SPECIAL REQUESTS Culture result: MRSA target DNA detected, SA target DNA detected. A positive test result does not necessarily indicate the presence of viable organisms. It is however, presumptive for the presence of MRSA or SA. RESULTS VERIFIED, PHONED TO AND READ BACK BY 
ANNIE AT [place] [id number] ON 6.21.20   
  
 CULTURE, BLOOD [[id number]] Collected:  06/21/20 0309 Order Status:  Completed Specimen:  Blood Updated:  06/21/20 0316 CULTURE, BLOOD [[id number]] Collected:  06/21/20 0302 Order Status:  Completed Specimen:  Blood Updated:  06/21/20 0316 LABS Recent Labs  
  06/20/20 
2309 WBC 7.2 HGB 13.2* HCT 42.9  Recent Labs  
  06/20/20 
2309   
K 3.7  GLU 95  
CO2 27 BUN 15  
CREA 1.22  
MG 1.8 No results for input(s): PH, PCO2, PO2, HCO3 in the last 72 hours. ECHO: 3/6/2020 [facility] Result Narrative · The left ventricular systolic function is decreased (20 - 25%). · Unable to assess left ventricular diastolic function. · There is mild concentric left ventricular hypertrophy present. · Left ventricular global hypokinesis. · The right ventricular systolic function is normal. 
· The aortic root is mildly dilated. Assessment:  
 
Hospital Problems  Never Reviewed Codes Class Noted POA Medical non-compliance ICD-10-CM: Z91.19 ICD-9-CM: V15.81  6/21/2020 Yes * (Principal) Acute systolic CHF (congestive heart failure) (HCC) ICD-10-CM: I50.21 ICD-9-CM: 428.21, 428.0  6/21/2020 Yes HTN (hypertension), malignant ICD-10-CM: I10 
ICD-9-CM: 401.0  6/21/2020 Yes Methamphetamine abuse ([facility] ICD-10-CM: F15.10 ICD-9-CM: 305.70  6/21/2020 Yes Tobacco abuse ICD-10-CM: Z72.0 ICD-9-CM: 305.1  6/21/2020 Yes Peripheral edema ICD-10-CM: R60.9 ICD-9-CM: 782.3  6/21/2020 Yes Cellulitis of abdominal wall ICD-10-CM: L03.311 ICD-9-CM: 682.2  6/21/2020 Yes Hepatitis C, chronic (HCC) ICD-10-CM: B18.2 ICD-9-CM: 070.54  6/21/2020 Yes Pleural effusion ICD-10-CM: J90 ICD-9-CM: 511.9  6/21/2020 Yes Cellulitis of both lower extremities ICD-10-CM: L03.115, L03.116 ICD-9-CM: 682.6  6/21/2020 Yes Plan:  
--ultrasound and possible thoracentesis for Monday--he is currently stable and on RA 
--continue IS 
--antibiotics per hospitalist--cleocin D1 
--continue diuresis per cardiology -- to assist with home needs--pt is likely homeless--?drug rehab? He seems slightly interested in rehab, but not sure of compliance?  
--given his history of noncompliance, not sure what his long term goals? Eli Gordon,  NP-C Lungs:  Dullness in right base Heart:  RRR with no Murmur/Rubs/Gallops Additional Comments: Will check effusion with U/S in am since he is comfortable on RA and has effusion for several months which is likely related to CHF and liver disease I have spoken with and examined the patient. I agree with the above assessment and plan as documented.  
 
Gris Hager MD 
 
 
 
More than 50% of time documented was spent in face-to-face contact with the patient and in the care of the patient on the floor/unit where the patient is located.

## 2020-06-21 NOTE — ED PROVIDER NOTES
Patient presents to the ER complaint of worsening shortness of breath. Patient reports shortness of breath has worsened over the past 2 days. States he was admitted at outside facility and \"signed himself out\". States breathing has worsened since then. Has swelling in his legs. Denies any fevers. Reports minimal cough. States he has been noncompliant with his medications. States she really does not have a place to live. Denies any vomiting. Denies any chest pain. The history is provided by the patient. Respiratory Distress   This is a new problem. The problem occurs frequently. The current episode started 2 days ago. Associated symptoms include cough, orthopnea and leg swelling. Pertinent negatives include no fever, no headaches, no wheezing and no abdominal pain. Associated medical issues include chronic lung disease and heart failure. No past medical history on file. No past surgical history on file. No family history on file.     Social History     Socioeconomic History    Marital status: SINGLE     Spouse name: Not on file    Number of children: Not on file    Years of education: Not on file    Highest education level: Not on file   Occupational History    Not on file   Social Needs    Financial resource strain: Not on file    Food insecurity     Worry: Not on file     Inability: Not on file    Transportation needs     Medical: Not on file     Non-medical: Not on file   Tobacco Use    Smoking status: Not on file   Substance and Sexual Activity    Alcohol use: Not on file    Drug use: Not on file    Sexual activity: Not on file   Lifestyle    Physical activity     Days per week: Not on file     Minutes per session: Not on file    Stress: Not on file   Relationships    Social connections     Talks on phone: Not on file     Gets together: Not on file     Attends [employer] service: Not on file     Active member of club or organization: Not on file     Attends meetings of clubs or organizations: Not on file     Relationship status: Not on file    Intimate partner violence     Fear of current or ex partner: Not on file     Emotionally abused: Not on file     Physically abused: Not on file     Forced sexual activity: Not on file   Other Topics Concern    Not on file   Social History Narrative    Not on file         ALLERGIES: Patient has no known allergies. Review of Systems   Constitutional: Positive for fatigue. Negative for fever and unexpected weight change. HENT: Negative for congestion. Eyes: Negative for photophobia and visual disturbance. Respiratory: Positive for cough and chest tightness. Negative for wheezing. Cardiovascular: Positive for orthopnea and leg swelling. Gastrointestinal: Negative for abdominal pain and anal bleeding. Musculoskeletal: Negative for back pain. Skin: Negative for color change. Neurological: Positive for weakness. Negative for speech difficulty and headaches. Hematological: Negative for adenopathy. Psychiatric/Behavioral: Negative for behavioral problems and confusion. All other systems reviewed and are negative. Vitals:    06/20/20 2300   BP: (!) 132/91   Pulse: (!) 125   Resp: 24   Temp: 98.2 °F (36.8 °C)   SpO2: 98%   Weight: 71.7 kg (158 lb)   Height: 6' 1\" (1.854 m)            Physical Exam  Vitals signs and nursing note reviewed. Constitutional:       Appearance: Normal appearance. HENT:      Head: Normocephalic and atraumatic. Nose: Nose normal.      Mouth/Throat:      Mouth: Mucous membranes are moist.   Eyes:      Extraocular Movements: Extraocular movements intact. Pupils: Pupils are equal, round, and reactive to light. Neck:      Musculoskeletal: Normal range of motion and neck supple. Cardiovascular:      Rate and Rhythm: Tachycardia present. Pulses: Normal pulses. Heart sounds: Normal heart sounds.    Pulmonary:      Effort: Pulmonary effort is normal.      Breath sounds: Rhonchi present. Abdominal:      General: Abdomen is flat. Bowel sounds are normal. There is distension. Palpations: There is no mass. Hernia: No hernia is present. Musculoskeletal:      Right lower leg: Edema present. Left lower leg: Edema present. Skin:     General: Skin is warm. Neurological:      General: No focal deficit present. Mental Status: He is alert. Mental status is at baseline. MDM  Number of Diagnoses or Management Options  Diagnosis management comments: Appears to be in volume overload. Review of outside records show a history of noncompliance, polysubstance abuse as well    11:36 PM  X-ray shows cardiomegaly, right lung base infiltrate and pleural effusion      12:51 AM  Lactic acid is elevated at 3 but patient has normal white blood cell count and a normal procalcitonin not feel this is indicative of any infection but more likely volume overload. Has been given 80 Lasix. Feel he likely needs admission for diuresis although he is not hypoxic has significant edema and exertional dyspnea.     Patient is agreeable to staying in the hospital for diuresis       Amount and/or Complexity of Data Reviewed  Clinical lab tests: ordered and reviewed  Tests in the radiology section of CPT®: ordered and reviewed    Risk of Complications, Morbidity, and/or Mortality  Presenting problems: high  Diagnostic procedures: moderate  Management options: high    Patient Progress  Patient progress: stable         Procedures          Results Include:    Recent Results (from the past 24 hour(s))   CBC WITH AUTOMATED DIFF    Collection Time: 06/20/20 11:09 PM   Result Value Ref Range    WBC 7.2 4.3 - 11.1 K/uL    RBC 4.75 4.23 - 5.6 M/uL    HGB 13.2 (L) 13.6 - 17.2 g/dL    HCT 42.9 41.1 - 50.3 %    MCV 90.3 79.6 - 97.8 FL    MCH 27.8 26.1 - 32.9 PG    MCHC 30.8 (L) 31.4 - 35.0 g/dL    RDW 18.5 (H) 11.9 - 14.6 %    PLATELET 014 085 - 782 K/uL    MPV 10.1 9.4 - 12.3 FL    ABSOLUTE NRBC 0. 00 0.0 - 0.2 K/uL    DF AUTOMATED      NEUTROPHILS 65 43 - 78 %    LYMPHOCYTES 22 13 - 44 %    MONOCYTES 11 4.0 - 12.0 %    EOSINOPHILS 0 (L) 0.5 - 7.8 %    BASOPHILS 1 0.0 - 2.0 %    IMMATURE GRANULOCYTES 0 0.0 - 5.0 %    ABS. NEUTROPHILS 4.7 1.7 - 8.2 K/UL    ABS. LYMPHOCYTES 1.6 0.5 - 4.6 K/UL    ABS. MONOCYTES 0.8 0.1 - 1.3 K/UL    ABS. EOSINOPHILS 0.0 0.0 - 0.8 K/UL    ABS. BASOPHILS 0.1 0.0 - 0.2 K/UL    ABS. IMM. GRANS. 0.0 0.0 - 0.5 K/UL   METABOLIC PANEL, COMPREHENSIVE    Collection Time: 06/20/20 11:09 PM   Result Value Ref Range    Sodium 140 136 - 145 mmol/L    Potassium 3.7 3.5 - 5.1 mmol/L    Chloride 105 98 - 107 mmol/L    CO2 27 21 - 32 mmol/L    Anion gap 8 7 - 16 mmol/L    Glucose 95 65 - 100 mg/dL    BUN 15 6 - 23 MG/DL    Creatinine 1.22 0.8 - 1.5 MG/DL    GFR est AA >60 >60 ml/min/1.73m2    GFR est non-AA >60 >60 ml/min/1.73m2    Calcium 8.6 8.3 - 10.4 MG/DL    Bilirubin, total 1.9 (H) 0.2 - 1.1 MG/DL    ALT (SGPT) 14 12 - 65 U/L    AST (SGOT) 25 15 - 37 U/L    Alk. phosphatase 166 (H) 50 - 136 U/L    Protein, total 7.5 6.3 - 8.2 g/dL    Albumin 2.9 (L) 3.5 - 5.0 g/dL    Globulin 4.6 (H) 2.3 - 3.5 g/dL    A-G Ratio 0.6 (L) 1.2 - 3.5     MAGNESIUM    Collection Time: 06/20/20 11:09 PM   Result Value Ref Range    Magnesium 1.8 1.8 - 2.4 mg/dL   NT-PRO BNP    Collection Time: 06/20/20 11:09 PM   Result Value Ref Range    NT pro-BNP 8,814 (H) 5 - 125 PG/ML   PROCALCITONIN    Collection Time: 06/20/20 11:09 PM   Result Value Ref Range    Procalcitonin <0.05 ng/mL   LACTIC ACID    Collection Time: 06/20/20 11:21 PM   Result Value Ref Range    Lactic acid 3.0 (HH) 0.4 - 2.0 MMOL/L     Voice dictation software was used during the making of this note. This software is not perfect and grammatical and other typographical errors may be present. This note has been proofread, but may still contain errors.   Rosita Lozano MD; 6/21/2020 @12:51 AM ===================================================================

## 2020-06-21 NOTE — ED NOTES
TRANSFER - OUT REPORT:    Verbal report given to Ross Miller RN (name) on Sheeba Ramos  being transferred to 3(unit) for routine progression of care       Report consisted of patients Situation, Background, Assessment and   Recommendations(SBAR). Information from the following report(s) SBAR, Kardex, ED Summary, [facility] and Recent Results was reviewed with the receiving nurse. Lines:   Peripheral IV 06/20/20 Right Antecubital (Active)   Site Assessment Clean, dry, & intact 6/20/2020 11:07 PM   Phlebitis Assessment 0 6/20/2020 11:07 PM   Infiltration Assessment 0 6/20/2020 11:07 PM   Dressing Status Clean, dry, & intact 6/20/2020 11:07 PM   Dressing Type Transparent 6/20/2020 11:07 PM   Hub Color/Line Status Green 6/20/2020 11:07 PM        Opportunity for questions and clarification was provided.       Patient transported with:   Monitor  Registered Nurse

## 2020-06-21 NOTE — CONSULTS
PULMONARY/CCM CONSULT :  6/21/2020    Date of Admission:  6/20/2020    The patient's chart has been reviewed and the chart has been discussed with nursing staff. Subjective: This patient has been seen and evaluated at the request of Dr. Jimi Emerson. Patient is a 42 y.o. male presents with a PMHx dilated cardiomyopathy secondary to drug abuse with an EF 20-25%, medical non-compliance, hepatitis C, MRSA, cardiac arrest Dec 2019 (signed out AMA after this admission), V-fib, umbilical hernia, and polysubstance abuse. He just left [facility] AMA again and presented to the ED with worsening swelling in legs and SOB. Admits to using meth yesterday by smoking it, as he could not find a vein to inject it into due to swelling. He also is a current every day smoker with 1/2 ppd for the past 10 years. Admits to alcohol use of 5 cans of beer per week. Now has swelling of abdominal wall and legs that appears cellulitic. A paracentesis was attempted at [place] 6/18/2020 with only 3 ml removed. Pt reports he did not tolerate procedure well and \"moved a lot\". Reviewing chart from [place], has had bilateral effusions present since at least 11/2019, but no thoracentesis has been completed. He was intubated Dec 8 2019 r/t cardiac and extubated a  Few days later without complication. He then proceeded to sign out AMA. He does not report using any inhalers or bronchodilators. And reports taking his other meds at times, but admits to frequently missing them. This admission, Urine drug screen was positive for amphetamines and benzos. MRSA nasal swab positive as well. Pro-BNp 8,814, procal <0.05, LA 3.0, and C-reactive protein 2. 1. CXR showed right lung atelectasis and moderate pleural effusion with cardiomegaly. We were consulted for evaluation of his pleural effusion. He is currently on RA with a O2 sat of 93%. No past surgical history on file.    Social History     Tobacco Use    Smoking status: Not on file Substance Use Topics    Alcohol use: Not on file      No family history on file. No Known Allergies   None       MEDS SCHEDULED:    Current Facility-Administered Medications   Medication Dose Route Frequency    amiodarone (CORDARONE) tablet 200 mg  200 mg Oral DAILY    aspirin chewable tablet 81 mg  81 mg Oral DAILY    lisinopriL (PRINIVIL, ZESTRIL) tablet 10 mg  10 mg Oral DAILY    metoprolol succinate (TOPROL-XL) XL tablet 25 mg  25 mg Oral DAILY    spironolactone (ALDACTONE) tablet 25 mg  25 mg Oral DAILY    hydrALAZINE (APRESOLINE) 20 mg/mL injection 20 mg  20 mg IntraVENous ONCE    clindamycin (CLEOCIN) 900mg D5W 50mL IVPB (premix)  900 mg IntraVENous Q8H    ondansetron (ZOFRAN) injection 4 mg  4 mg IntraVENous Q6H PRN    acetaminophen (TYLENOL) tablet 650 mg  650 mg Oral Q6H PRN    furosemide (LASIX) injection 40 mg  40 mg IntraVENous BID    heparin (porcine) injection 5,000 Units  5,000 Units SubCUTAneous Q8H    alcohol 62% (NOZIN) nasal  1 Ampule  1 Ampule Topical Q12H         Review of Systems  Pertinent items are noted in HPI. Objective:     Vitals:    06/21/20 0119 06/21/20 0135 06/21/20 0454 06/21/20 0811   BP: 125/90 (!) 124/93 113/84 106/77   Pulse: (!) 121 (!) 122 (!) 112 (!) 118   Resp: 17  18 16   Temp:  98.2 °F (36.8 °C) 98.7 °F (37.1 °C) 98.1 °F (36.7 °C)   SpO2: 96% 100% 96% 93%   Weight:  218 lb 14.4 oz (99.3 kg)     Height:  6' 1\" (1.854 m)       No intake/output data recorded. 06/19 1901 - 06/21 0700  In: -   Out: 975 [Urine:975]      PHYSICAL EXAM     Physical Exam:   General:  Alert, cooperative, no acute distress, appears stated age. Eyes:  Conjunctivae/corneas clear. Nose: Nares patent and moist.    Mouth/Throat: Lips, mucosa, and tongue pink and intact. Neck: Supple, symmetrical.   Respiratory:   Diminished RT lung, clear otherwise   Cardiovascular:  Regular rate and rhythm, S1, S2, no murmur, click, rub or gallop.   Telemetry monitor: DANYELL   GI: Abdomen semi-firm, tender, redness appears cellulitic. Bowel sounds active X 4 Q. Hiatal hernia noted,     Musculoskeletal: Extremities symmetrical, atraumatic, tattoos noted, reddened and tight no cyanosis, 3+ pitting edema. Pulses: 2+ and symmetric all extremities. Skin: Skin color, texture, turgor normal. No rashes or lesions       Neurologic: 2+ strength bilateral upper and lower extremities, sensation throughout appropriate. Alert and oriented. Slightly anxious       CHEST X-RAYS:  6/20/20    CULTURES:  Results     Procedure Component Value Units Date/Time    MSSA/MRSA SC BY PCR, NASAL SWAB [[id number]]  (Abnormal) Collected:  06/21/20 0322    Order Status:  Completed Specimen:  Nasal swab Updated:  06/21/20 0609     Special Requests: NO SPECIAL REQUESTS        Culture result:       MRSA target DNA detected, SA target DNA detected. A positive test result does not necessarily indicate the presence of viable organisms. It is however, presumptive for the presence of MRSA or SA.                  RESULTS VERIFIED, PHONED TO AND READ BACK BY  ANNIE AT [place] [id number] ON 6.21.20        CULTURE, BLOOD [[id number]] Collected:  06/21/20 0309    Order Status:  Completed Specimen:  Blood Updated:  06/21/20 0316    CULTURE, BLOOD [[id number]] Collected:  06/21/20 0302    Order Status:  Completed Specimen:  Blood Updated:  06/21/20 0316             LABS    Recent Labs     06/20/20  2309   WBC 7.2   HGB 13.2*   HCT 42.9        Recent Labs     06/20/20  2309      K 3.7      GLU 95   CO2 27   BUN 15   CREA 1.22   MG 1.8     No results for input(s): PH, PCO2, PO2, HCO3 in the last 72 hours. ECHO: 3/6/2020 [facility]  Result Narrative   · The left ventricular systolic function is decreased (20 - 25%). · Unable to assess left ventricular diastolic function. · There is mild concentric left ventricular hypertrophy present. · Left ventricular global hypokinesis.   · The right ventricular systolic function is normal.  · The aortic root is mildly dilated. Assessment:     Hospital Problems  Never Reviewed          Codes Class Noted POA    Medical non-compliance ICD-10-CM: Z91.19  ICD-9-CM: V15.81  6/21/2020 Yes        * (Principal) Acute systolic CHF (congestive heart failure) ([facility] ICD-10-CM: I50.21  ICD-9-CM: 428.21, 428.0  6/21/2020 Yes        HTN (hypertension), malignant ICD-10-CM: I10  ICD-9-CM: 401.0  6/21/2020 Yes        Methamphetamine abuse ([facility] ICD-10-CM: F15.10  ICD-9-CM: 305.70  6/21/2020 Yes        Tobacco abuse ICD-10-CM: Z72.0  ICD-9-CM: 305.1  6/21/2020 Yes        Peripheral edema ICD-10-CM: R60.9  ICD-9-CM: 782.3  6/21/2020 Yes        Cellulitis of abdominal wall ICD-10-CM: L03.311  ICD-9-CM: 682.2  6/21/2020 Yes        Hepatitis C, chronic (HCC) ICD-10-CM: B18.2  ICD-9-CM: 070.54  6/21/2020 Yes        Pleural effusion ICD-10-CM: J90  ICD-9-CM: 511.9  6/21/2020 Yes        Cellulitis of both lower extremities ICD-10-CM: L03.115, L03.116  ICD-9-CM: 682.6  6/21/2020 Yes              Plan:   --ultrasound and possible thoracentesis for Monday--he is currently stable and on RA  --continue IS  --antibiotics per hospitalist--cleocin D1  --continue diuresis per cardiology  -- to assist with home needs--pt is likely homeless--?drug rehab? He seems slightly interested in rehab, but not sure of compliance?   --given his history of noncompliance, not sure what his long term goals? Denisse Chi,  NP-C  Lungs:  Dullness in right base  Heart:  RRR with no Murmur/Rubs/Gallops    Additional Comments: Will check effusion with U/S in am since he is comfortable on RA and has effusion for several months which is likely related to CHF and liver disease    I have spoken with and examined the patient. I agree with the above assessment and plan as documented.     Domitila Regan MD        More than 50% of time documented was spent in face-to-face contact with the patient and in the care of the patient on the floor/unit where the patient is located.

## 2020-06-21 NOTE — PROGRESS NOTES
TRANSFER - IN REPORT:    Verbal report received from Priti celestin RN on Sophie Kowalski being received from ED for routine progression of care. Report consisted of patients Situation, Background, Assessment and Recommendations(SBAR). Information from the following report(s) SBAR, Kardex, ED Summary, [facility] and Recent Results was reviewed. Opportunity for questions and clarification was provided. Assessment completed upon patients arrival to unit and care assumed. Patient received to room 310. Patient connected to monitor and assessment completed. Plan of care reviewed. Patient oriented to room and call light. Patient aware to use call light to communicate any chest pain or needs. Admission skin assessment completed with second RN and reveals the following: pt has some scattered bruising and scars all over. BLE edema with redness. Sacrum and heels are intact.

## 2020-06-22 VITALS
HEIGHT: 73 IN | HEART RATE: 84 BPM | SYSTOLIC BLOOD PRESSURE: 99 MMHG | WEIGHT: 217.1 LBS | BODY MASS INDEX: 28.77 KG/M2 | OXYGEN SATURATION: 94 % | DIASTOLIC BLOOD PRESSURE: 79 MMHG | TEMPERATURE: 97.4 F | RESPIRATION RATE: 20 BRPM

## 2020-06-22 LAB
ALBUMIN SERPL-MCNC: 1.5 G/DL (ref 3.5–5)
ALBUMIN/GLOB SERPL: 0.3 {RATIO} (ref 1.2–3.5)
ALP SERPL-CCNC: 141 U/L (ref 50–136)
ALT SERPL-CCNC: 14 U/L (ref 12–65)
ANION GAP SERPL CALC-SCNC: 10 MMOL/L (ref 7–16)
APPEARANCE FLD: NORMAL
AST SERPL-CCNC: 22 U/L (ref 15–37)
BILIRUB SERPL-MCNC: 1.7 MG/DL (ref 0.2–1.1)
BUN SERPL-MCNC: 21 MG/DL (ref 6–23)
CALCIUM SERPL-MCNC: 8.1 MG/DL (ref 8.3–10.4)
CHLORIDE SERPL-SCNC: 102 MMOL/L (ref 98–107)
CO2 SERPL-SCNC: 25 MMOL/L (ref 21–32)
COLOR FLD: YELLOW
CREAT SERPL-MCNC: 1.18 MG/DL (ref 0.8–1.5)
GLOBULIN SER CALC-MCNC: 5.2 G/DL (ref 2.3–3.5)
GLUCOSE FLD-MCNC: 106 MG/DL
GLUCOSE SERPL-MCNC: 77 MG/DL (ref 65–100)
INR PPP: 1.3
LACTATE SERPL-SCNC: 2.8 MMOL/L (ref 0.4–2)
LDH FLD L TO P-CCNC: 62 U/L
LYMPHOCYTES NFR BRONCH MANUAL: 96 %
MACROPHAGES NFR BRONCH MANUAL: 4 %
MAGNESIUM SERPL-MCNC: 1.7 MG/DL (ref 1.8–2.4)
NUC CELL # FLD: 121 /CU MM
POTASSIUM SERPL-SCNC: 3.1 MMOL/L (ref 3.5–5.1)
PROT FLD-MCNC: 1.7 G/DL
PROT SERPL-MCNC: 6.7 G/DL (ref 6.3–8.2)
PROTHROMBIN TIME: 16.7 SEC (ref 12–14.7)
RBC # FLD: 2000 /CU MM
SODIUM SERPL-SCNC: 137 MMOL/L (ref 136–145)
SPECIMEN SOURCE FLD: NORMAL
TROPONIN-HIGH SENSITIVITY: 15.4 PG/ML (ref 0–14)

## 2020-06-22 PROCEDURE — 74011250636 HC RX REV CODE- 250/636: Performed by: INTERNAL MEDICINE

## 2020-06-22 PROCEDURE — 84484 ASSAY OF TROPONIN QUANT: CPT

## 2020-06-22 PROCEDURE — 84157 ASSAY OF PROTEIN OTHER: CPT

## 2020-06-22 PROCEDURE — 32555 ASPIRATE PLEURA W/ IMAGING: CPT | Performed by: INTERNAL MEDICINE

## 2020-06-22 PROCEDURE — 83605 ASSAY OF LACTIC ACID: CPT

## 2020-06-22 PROCEDURE — 76040000007: Performed by: INTERNAL MEDICINE

## 2020-06-22 PROCEDURE — 80053 COMPREHEN METABOLIC PANEL: CPT

## 2020-06-22 PROCEDURE — 89050 BODY FLUID CELL COUNT: CPT

## 2020-06-22 PROCEDURE — 82945 GLUCOSE OTHER FLUID: CPT

## 2020-06-22 PROCEDURE — 74011250636 HC RX REV CODE- 250/636: Performed by: HOSPITALIST

## 2020-06-22 PROCEDURE — 87205 SMEAR GRAM STAIN: CPT

## 2020-06-22 PROCEDURE — 87116 MYCOBACTERIA CULTURE: CPT

## 2020-06-22 PROCEDURE — 74011250637 HC RX REV CODE- 250/637: Performed by: HOSPITALIST

## 2020-06-22 PROCEDURE — 0W993ZZ DRAINAGE OF RIGHT PLEURAL CAVITY, PERCUTANEOUS APPROACH: ICD-10-PCS | Performed by: INTERNAL MEDICINE

## 2020-06-22 PROCEDURE — 87102 FUNGUS ISOLATION CULTURE: CPT

## 2020-06-22 PROCEDURE — 88112 CYTOPATH CELL ENHANCE TECH: CPT

## 2020-06-22 PROCEDURE — 74011250637 HC RX REV CODE- 250/637: Performed by: INTERNAL MEDICINE

## 2020-06-22 PROCEDURE — 85610 PROTHROMBIN TIME: CPT

## 2020-06-22 PROCEDURE — 83735 ASSAY OF MAGNESIUM: CPT

## 2020-06-22 PROCEDURE — 99232 SBSQ HOSP IP/OBS MODERATE 35: CPT | Performed by: INTERNAL MEDICINE

## 2020-06-22 PROCEDURE — 77030014147 HC TY THORCENT PARA TELE -B: Performed by: INTERNAL MEDICINE

## 2020-06-22 PROCEDURE — 36415 COLL VENOUS BLD VENIPUNCTURE: CPT

## 2020-06-22 PROCEDURE — 83615 LACTATE (LD) (LDH) ENZYME: CPT

## 2020-06-22 PROCEDURE — 88305 TISSUE EXAM BY PATHOLOGIST: CPT

## 2020-06-22 RX ORDER — SPIRONOLACTONE 25 MG/1
25 TABLET ORAL DAILY
Qty: 90 TAB | Refills: 0 | Status: SHIPPED | OUTPATIENT
Start: 2020-06-23

## 2020-06-22 RX ORDER — POTASSIUM CHLORIDE 20 MEQ/1
40 TABLET, EXTENDED RELEASE ORAL
Status: COMPLETED | OUTPATIENT
Start: 2020-06-22 | End: 2020-06-22

## 2020-06-22 RX ORDER — HYDROCODONE BITARTRATE AND ACETAMINOPHEN 7.5; 325 MG/1; MG/1
1 TABLET ORAL
Status: DISCONTINUED | OUTPATIENT
Start: 2020-06-22 | End: 2020-06-22 | Stop reason: HOSPADM

## 2020-06-22 RX ORDER — LISINOPRIL 10 MG/1
10 TABLET ORAL DAILY
Qty: 90 TAB | Refills: 0 | Status: SHIPPED | OUTPATIENT
Start: 2020-06-23

## 2020-06-22 RX ORDER — CLINDAMYCIN HYDROCHLORIDE 150 MG/1
300 CAPSULE ORAL 4 TIMES DAILY
Status: DISCONTINUED | OUTPATIENT
Start: 2020-06-22 | End: 2020-06-22 | Stop reason: HOSPADM

## 2020-06-22 RX ORDER — AMIODARONE HYDROCHLORIDE 200 MG/1
200 TABLET ORAL DAILY
Qty: 90 TAB | Refills: 0 | Status: SHIPPED | OUTPATIENT
Start: 2020-06-23

## 2020-06-22 RX ORDER — METOPROLOL SUCCINATE 25 MG/1
25 TABLET, EXTENDED RELEASE ORAL DAILY
Qty: 90 TAB | Refills: 0 | Status: SHIPPED | OUTPATIENT
Start: 2020-06-23

## 2020-06-22 RX ORDER — MAGNESIUM SULFATE HEPTAHYDRATE 40 MG/ML
2 INJECTION, SOLUTION INTRAVENOUS ONCE
Status: COMPLETED | OUTPATIENT
Start: 2020-06-22 | End: 2020-06-22

## 2020-06-22 RX ORDER — CLINDAMYCIN HYDROCHLORIDE 300 MG/1
300 CAPSULE ORAL 4 TIMES DAILY
Qty: 12 CAP | Refills: 0 | Status: SHIPPED | OUTPATIENT
Start: 2020-06-22

## 2020-06-22 RX ORDER — FUROSEMIDE 40 MG/1
40 TABLET ORAL 2 TIMES DAILY
Qty: 180 TAB | Refills: 0 | Status: SHIPPED | OUTPATIENT
Start: 2020-06-22

## 2020-06-22 RX ADMIN — HEPARIN SODIUM 5000 UNITS: 5000 INJECTION INTRAVENOUS; SUBCUTANEOUS at 06:04

## 2020-06-22 RX ADMIN — POTASSIUM CHLORIDE 40 MEQ: 20 TABLET, EXTENDED RELEASE ORAL at 13:55

## 2020-06-22 RX ADMIN — ACETAMINOPHEN 650 MG: 325 TABLET, FILM COATED ORAL at 04:49

## 2020-06-22 RX ADMIN — SPIRONOLACTONE 25 MG: 25 TABLET ORAL at 09:00

## 2020-06-22 RX ADMIN — FUROSEMIDE 40 MG: 10 INJECTION, SOLUTION INTRAMUSCULAR; INTRAVENOUS at 09:01

## 2020-06-22 RX ADMIN — ASPIRIN 81 MG 81 MG: 81 TABLET ORAL at 09:00

## 2020-06-22 RX ADMIN — HYDROCODONE BITARTRATE AND ACETAMINOPHEN 1 TABLET: 7.5; 325 TABLET ORAL at 14:57

## 2020-06-22 RX ADMIN — Medication 1 AMPULE: at 09:01

## 2020-06-22 RX ADMIN — CLINDAMYCIN IN 5 PERCENT DEXTROSE 900 MG: 18 INJECTION, SOLUTION INTRAVENOUS at 10:03

## 2020-06-22 RX ADMIN — METOPROLOL SUCCINATE 25 MG: 25 TABLET, EXTENDED RELEASE ORAL at 09:00

## 2020-06-22 RX ADMIN — LISINOPRIL 10 MG: 5 TABLET ORAL at 09:00

## 2020-06-22 RX ADMIN — MAGNESIUM SULFATE HEPTAHYDRATE 2 G: 40 INJECTION, SOLUTION INTRAVENOUS at 13:55

## 2020-06-22 RX ADMIN — HEPARIN SODIUM 5000 UNITS: 5000 INJECTION INTRAVENOUS; SUBCUTANEOUS at 13:55

## 2020-06-22 RX ADMIN — CLINDAMYCIN IN 5 PERCENT DEXTROSE 900 MG: 18 INJECTION, SOLUTION INTRAVENOUS at 02:34

## 2020-06-22 RX ADMIN — AMIODARONE HYDROCHLORIDE 200 MG: 200 TABLET ORAL at 09:00

## 2020-06-22 NOTE — PROGRESS NOTES
Pt sat up on side of bed for thoracentesis. Consent obtained. Time out performed. Pts vitals monitored throughout procedure. Right ultrasound done and pic taken of pleural fluid. ~2400 ml yellow pleural fluid from right. Pt tolerated procedure well with no adverse rxn. Specimens sent to the lab x 3 and labeled appropriately. Site dressed appropriately and report given to pts RN. Lung sliding done and ultrasound findings reviewed by MD. CXR ordered post procedure.

## 2020-06-22 NOTE — PROGRESS NOTES
Hospitalist Note     Admit Date:  2020 11:08 PM   Name:  Melissa Potter   Age:  42 y.o.  :  1980   MRN:  [id number]   PCP:  None  Treatment Team: Attending Provider: Rebekah Mueller MD; Consulting Provider: Katia Zelaya MD; Primary Nurse: Ivonne Vann; Consulting Provider: Mary Rogel; Primary Nurse: Arnoldo Frank RN; Primary Nurse: Briana Alcaraz; Care Manager: Bharati Rodrigez RN    HPI/Subjective:   Mr. Jennifer Ramirez is a 41 y/o WM with a h/o drug-induced CM, non-compliance, polysubstance abuse who was recently hospitalized at [facility] for acute sCHF. He signed out AMA and eventually found his way to [facility] ER. CXR with congestion. Peripheral edema, some concern for cellulitis given erythema. Admitted for acute on chronic sCHF.    : Wants to go home. Had right thora today, 2400 off. Net neg 1.2L. No other complaints  Objective:     Patient Vitals for the past 24 hrs:   Temp Pulse Resp BP SpO2   20 1305  87 18 103/71 100 %   20 1255  86 16 105/77 98 %   20 1240  90 18 101/79 97 %   20 1225  88 16 107/80 100 %   20 0814 97.4 °F (36.3 °C) 96 20 109/71 98 %   20 0442 98.1 °F (36.7 °C) 92 18 112/84 96 %   20 0032 97 °F (36.1 °C) 80 20 97/63 97 %   20 2044 98.6 °F (37 °C) 99 18 95/60 100 %   20 1700 98 °F (36.7 °C) 90 16 108/88 99 %     Oxygen Therapy  O2 Sat (%): 100 % (20 1305)  Pulse via Oximetry: 125 beats per minute (20 0119)  O2 Device: Room air (20 1305)    Estimated body mass index is 28.64 kg/m² as calculated from the following:    Height as of this encounter: 6' 1\" (1.854 m). Weight as of this encounter: 98.5 kg (217 lb 1.6 oz).       Intake/Output Summary (Last 24 hours) at 2020 1441  Last data filed at 2020 1215  Gross per 24 hour   Intake 530 ml   Output 700 ml   Net -170 ml       *Note that automatically entered I/Os may not be accurate; dependent on patient compliance with collection and accurate  by [employer]. General:    Well nourished. Alert. Appears older than stated age. CV:   RRR. No murmur, rub, or gallop. Lungs:   Decreased at the right base but better than yesterday. Clear otherwise. Abdomen:   Soft, nontender, nondistended. Umbilical hernia? Mild erythema. Extremities: Warm and dry. No cyanosis. 2+ edema b/l LEs to knees/lateral thighs. Erythema of legs. Skin:     No rashes or jaundice. Neuro:  No gross focal deficits    Data Review:  I have reviewed all labs, meds, and studies from the last 24 hours:  Recent Results (from the past 24 hour(s))   MAGNESIUM    Collection Time: 06/22/20  4:25 AM   Result Value Ref Range    Magnesium 1.7 (L) 1.8 - 2.4 mg/dL   METABOLIC PANEL, COMPREHENSIVE    Collection Time: 06/22/20  4:25 AM   Result Value Ref Range    Sodium 137 136 - 145 mmol/L    Potassium 3.1 (L) 3.5 - 5.1 mmol/L    Chloride 102 98 - 107 mmol/L    CO2 25 21 - 32 mmol/L    Anion gap 10 7 - 16 mmol/L    Glucose 77 65 - 100 mg/dL    BUN 21 6 - 23 MG/DL    Creatinine 1.18 0.8 - 1.5 MG/DL    GFR est AA >60 >60 ml/min/1.73m2    GFR est non-AA >60 >60 ml/min/1.73m2    Calcium 8.1 (L) 8.3 - 10.4 MG/DL    Bilirubin, total 1.7 (H) 0.2 - 1.1 MG/DL    ALT (SGPT) 14 12 - 65 U/L    AST (SGOT) 22 15 - 37 U/L    Alk.  phosphatase 141 (H) 50 - 136 U/L    Protein, total 6.7 6.3 - 8.2 g/dL    Albumin 1.5 (L) 3.5 - 5.0 g/dL    Globulin 5.2 (H) 2.3 - 3.5 g/dL    A-G Ratio 0.3 (L) 1.2 - 3.5     PROTHROMBIN TIME + INR    Collection Time: 06/22/20  4:25 AM   Result Value Ref Range    Prothrombin time 16.7 (H) 12.0 - 14.7 sec    INR 1.3     TROPONIN-HIGH SENSITIVITY    Collection Time: 06/22/20  4:25 AM   Result Value Ref Range    Troponin-High Sensitivity 15.4 (H) 0 - 14 pg/mL   LACTIC ACID    Collection Time: 06/22/20  4:36 AM   Result Value Ref Range    Lactic acid 2.8 (HH) 0.4 - 2.0 MMOL/L        Current Meds:  Current Facility-Administered Medications   Medication Dose Route Frequency    magnesium sulfate 2 g/50 ml IVPB (premix or compounded)  2 g IntraVENous ONCE    HYDROcodone-acetaminophen (NORCO) 7.5-325 mg per tablet 1 Tab  1 Tab Oral Q4H PRN    clindamycin (CLEOCIN) capsule 300 mg  300 mg Oral QID    amiodarone (CORDARONE) tablet 200 mg  200 mg Oral DAILY    lisinopriL (PRINIVIL, ZESTRIL) tablet 10 mg  10 mg Oral DAILY    metoprolol succinate (TOPROL-XL) XL tablet 25 mg  25 mg Oral DAILY    spironolactone (ALDACTONE) tablet 25 mg  25 mg Oral DAILY    ondansetron (ZOFRAN) injection 4 mg  4 mg IntraVENous Q6H PRN    acetaminophen (TYLENOL) tablet 650 mg  650 mg Oral Q6H PRN    furosemide (LASIX) injection 40 mg  40 mg IntraVENous BID    heparin (porcine) injection 5,000 Units  5,000 Units SubCUTAneous Q8H    alcohol 62% (NOZIN) nasal  1 Ampule  1 Ampule Topical Q12H       Other Studies:  No results found for this visit on 06/20/20. No results found.     All Micro Results     Procedure Component Value Units Date/Time    AFB CULTURE + SMEAR W/RFLX ID FROM CULTURE [[id number]] Collected:  06/22/20 1315    Order Status:  Completed Updated:  06/22/20 1434    CULTURE, BODY FLUID Clois Moros [[id number]] Collected:  06/22/20 1315    Order Status:  Completed Updated:  06/22/20 1428    FUNGUS CULTURE AND SMEAR [[id number]] Collected:  06/22/20 1315    Order Status:  Completed Updated:  06/22/20 1428    CULTURE, BLOOD [[id number]] Collected:  06/21/20 0302    Order Status:  Completed Specimen:  Blood Updated:  06/22/20 0639     Special Requests: --        LEFT  ARM       Culture result: NO GROWTH 1 DAY       CULTURE, BLOOD [[id number]] Collected:  06/21/20 0309    Order Status:  Completed Specimen:  Blood Updated:  06/22/20 0639     Special Requests: --        RIGHT  FOREARM       Culture result: NO GROWTH 1 DAY       MSSA/MRSA SC BY PCR, NASAL SWAB [[id number]]  (Abnormal) Collected:  06/21/20 0322    Order Status:  Completed Specimen: Nasal swab Updated:  06/21/20 0609     Special Requests: NO SPECIAL REQUESTS        Culture result:       MRSA target DNA detected, SA target DNA detected. A positive test result does not necessarily indicate the presence of viable organisms. It is however, presumptive for the presence of MRSA or SA.                  RESULTS VERIFIED, PHONED TO AND READ BACK BY  ANNIE AT 0609 ON 6.21.20  JL            SARS-CoV-2 Lab Results  \"Novel Coronavirus\" Test: No results found for: COV2NT   \"Emergent Disease\" Test: No results found for: EDPR  \"SARS-COV-2\" Test: No results found for: XGCOVT  As of: 2:41 PM on 6/22/2020        Assessment and Plan:     Hospital Problems as of 6/22/2020 Date Reviewed: 6/22/2020          Codes Class Noted - Resolved POA    Medical non-compliance ICD-10-CM: Z91.19  ICD-9-CM: V15.81  6/21/2020 - Present Yes        * (Principal) Acute systolic CHF (congestive heart failure) ([facility] ICD-10-CM: I50.21  ICD-9-CM: 428.21, 428.0  6/21/2020 - Present Yes        HTN (hypertension), malignant ICD-10-CM: I10  ICD-9-CM: 401.0  6/21/2020 - Present Yes        Methamphetamine abuse ([facility] ICD-10-CM: F15.10  ICD-9-CM: 305.70  6/21/2020 - Present Yes        Tobacco abuse ICD-10-CM: Z72.0  ICD-9-CM: 305.1  6/21/2020 - Present Yes        Peripheral edema ICD-10-CM: R60.9  ICD-9-CM: 782.3  6/21/2020 - Present Yes        Cellulitis of abdominal wall ICD-10-CM: L03.311  ICD-9-CM: 682.2  6/21/2020 - Present Yes        Hepatitis C, chronic ([facility] ICD-10-CM: B18.2  ICD-9-CM: 070.54  6/21/2020 - Present Yes        Pleural effusion ICD-10-CM: J90  ICD-9-CM: 511.9  6/21/2020 - Present Yes        Cellulitis of both lower extremities ICD-10-CM: L03.115, L03.116  ICD-9-CM: 682.6  6/21/2020 - Present Yes              Plan:  # Acute on chronic sCHF               - Drug-induced CM, UDS + BZDs, amphetamine              - IV Lasix, OMT              - H/o non-compliance   - Con't IV lasix today.  PO tomorrow?     # Right pleural effusion              - Thora 6/22, 2.4L off. RA O2. # HypoK/Mg   - Replace. Repeat tomorrow. # Possible cellulitis LEs/abdomen              - Erythema likely due to chronic swelling. Complete 5d course [place].     # Polysubstance abuse              - UDS + benzos and amphetamines    DC planning/Dispo: Home when able. 1-2d pending clinical course.   Diet:  DIET CARDIAC  DVT ppx: SCDs    Signed:  Vernetta Moritz, MD

## 2020-06-22 NOTE — PROCEDURES
Thoracentesis Procedure Note      Procedure:  R Thoracentesis with ultrasound guidance    Indication:  R Pleural effusion      Summary:    After informed consent was obtained, the patient was positioned upright. Ultrasound was used to identify the optimal spot for pleural drainage. The lower R intercostal space was anesthetized with 1% Lidocaine to achieve adequate anesthesia. The pleural space was entered with serosanguinous fluid identified. Lidocaine was instilled within the pleural space as well. A small stab incision was made at the site of local anesthesia and the thoracentesis catheter was advanced into the pleural space. Approximately 2400 ml of fluid was obtained with ease. The procedure was terminated after some R chest pain was noted. Air was not aspirated during the procedure. A pressure dressing was placed over the incision after adequate hemostasis was achieved. A CXR is not pending as a follow up US revealed a + lung slide c/w no PTX. The pleural fluid will be sent for protein, LDH, glucose, cell count with differential, Gram stain, culture and sensitivity, AFB smear and culture, fungal smear and culture, cytology and cell block.     EBL: negligible    Post Procedure Dx: Pleural effusion         Signed By: Fly Rice MD

## 2020-06-22 NOTE — PROGRESS NOTES
Dr. Yazan Granado was notified via perfect serve of a critical lactic of 2.8 and a elevated troponin of 15.4 no new orders received. Will continue to monitor.

## 2020-06-22 NOTE — PROGRESS NOTES
Patient has decided to leave [place]. I recommended continued management with IV Lasix and observation at least through tomorrow with re-evaluation in the morning since he just had a thoracentesis today. He wants to go home. I have printed out prescriptions for him. He previously said he would call [facility] and establish with a PCP. [place] papers signed with nursing.     Sandra Hernandez MD

## 2020-06-22 NOTE — INTERVAL H&P NOTE
Update History & Physical 
 
The Patient's progress note from 6/22/20 was reviewed with the patient . There was no change. The surgical site was confirmed by me. Plan:  The risk, benefits, expected outcome, and alternative to the recommended procedure have been discussed with the patient. Patient understands and wants to proceed with the procedure.  
 
Electronically signed by Jan Ge MD on 6/22/2020 at 12:32 PM

## 2020-06-22 NOTE — PROGRESS NOTES
Care Management Interventions  Mode of Transport at Discharge: Other (see comment)(Ruba Butcher Girlfriend   [phone] )  Transition of Care Consult (CM Consult): Discharge Planning  Discharge Durable Medical Equipment: No  Physical Therapy Consult: No  Occupational Therapy Consult: No  Current Support Network: Other(Lives with his girlfriend)  Confirm Follow Up Transport: Family  Discharge Location  Discharge Placement: Home    Pt admitted to 3rd floor [facility] for [afib/cp/a flutter] . CM met with pt to discuss CM needs & DCP. Pt is A&Ox4. Pt is indep at home with all ADLS. Pt lives with his girlfriend. In a double wide trailer with 2 steps at the entrance. Pt has a cane for ambulation. Pt states he has been in [facility] since he was young. He has not been recently, according to pt. Pt's girlfriend states he doesn't take medicines consistently. Pt doesn't have a photo ID. He doesn't have a birth certificate or SS card either according to pt's girlfriend Pt states he wants to leave today. CM to continue to monitor.     a

## 2020-06-22 NOTE — ROUTINE PROCESS
Verbal bedside report given to [facility], [facility], oncoming RN. Patient's situation, background, assessment and recommendations provided. Opportunity for questions provided. Oncoming RN assumed care of patient.

## 2020-06-22 NOTE — ROUTINE PROCESS
Verbal bedside report received from Gregor Carney, [facility]. Assumed care of patient.

## 2020-06-22 NOTE — PROGRESS NOTES
[facility] CARDIOLOGY PROGRESS NOTE           6/22/2020 8:06 AM    Admit Date: 6/20/2020      Subjective:   Patient arousable but withdrawn affect. Notes discomfort at \"hernia\". Lying flat in bed. Still with edema. EF at [facility] in [facility] 20-25%. ROS:  Cardiovascular:  As noted above    Objective:      Vitals:    06/21/20 1700 06/21/20 2044 06/22/20 0032 06/22/20 0442   BP: 108/88 95/60 97/63 112/84   Pulse: 90 99 80 92   Resp: 16 18 20 18   Temp: 98 °F (36.7 °C) 98.6 °F (37 °C) 97 °F (36.1 °C) 98.1 °F (36.7 °C)   SpO2: 99% 100% 97% 96%   Weight:    98.5 kg (217 lb 1.6 oz)   Height:           Physical Exam:  General-No Acute Distress  Neck- supple, no JVD  CV- regular rate and rhythm no MRG  Lung- clear bilaterally  Abd- soft, nontender, nondistended  Ext- 2+  edema bilaterally. Skin- warm and dry      Data Review:   Recent Labs     06/22/20  0425 06/20/20  2309    140   K 3.1* 3.7   MG 1.7* 1.8   BUN 21 15   CREA 1.18 1.22   GLU 77 95   WBC  --  7.2   HGB  --  13.2*   HCT  --  42.9   PLT  --  209   INR 1.3  --       No results found for: Earlean Gain, TNIPOC    Assessment/Plan:     Principal Problem:    Acute systolic CHF (congestive heart failure) ([facility] (6/21/2020)    Difficult situation with non-compliance and ongoing drug use. Poor prognosis. Continue IV lasix. Active Problems:    Medical non-compliance (6/21/2020)    Noted. HTN (hypertension), malignant (6/21/2020)    BP controlled on medications. Methamphetamine abuse ([address] (6/21/2020)    Poor prognosis discussed. Tobacco abuse (6/21/2020)    Smoking cessation encouraged. Cellulitis of abdominal wall (6/21/2020)    On Clindamycin. Hepatitis C, chronic ([address] (6/21/2020)    Defer management to primary team.        Pleural effusion (6/21/2020)    Pulmonary following. Cellulitis of both lower extremities (6/21/2020)    Continue Clindamycin.                    Stephanie Bob, MD  6/22/2020 8:06 AM

## 2020-06-22 NOTE — PROGRESS NOTES
DAILY PROGRESS NOTE:  6/22/2020    Date of Admission:  6/20/2020    The patient's chart has been reviewed and the chart has been discussed with nursing staff. Patient has been seen and evaluated at the request of Dr. Love Washington. He is a 42 y.o. male presents with a PMHx dilated cardiomyopathy secondary to drug abuse with an EF 20-25%, medical non-compliance, hepatitis C, MRSA, cardiac arrest Dec 2019 (signed out AMA after this admission), V-fib, umbilical hernia, and polysubstance abuse. He just left [place] AMA again and presented to the ED with worsening swelling in legs and SOB. Admits to using meth yesterday by smoking it, as he could not find a vein to inject it into due to swelling. He also is a current every day smoker with 1/2 ppd for the past 10 years. Admits to alcohol use of 5 cans of beer per week. Now has swelling of abdominal wall and legs that appears cellulitic. A paracentesis was attempted at [facility] 6/18/2020 with only 3 ml removed. Pt reports he did not tolerate procedure well and \"moved a lot\". Reviewing chart from [facility], has had bilateral effusions present since at least 11/2019, but no thoracentesis has been completed. He was intubated Dec 8 2019 r/t cardiac and extubated a few days later without complication then proceeded to sign out AMA. He does not use any inhalers or bronchodilators. Reports taking his other meds at times, but admits to frequently missing them. This admission, Urine drug screen was positive for amphetamines and benzos. MRSA nasal swab positive as well. Pro-BNp 8,814, procal <0.05, LA 3.0, and C-reactive protein 2.1. CXR showed right lung atelectasis and moderate pleural effusion with cardiomegaly. We were consulted for evaluation of his pleural effusion. He is currently on RA with a O2 sat of 93%. Subjective:       Lying in bed, moaning and states he is \"hurting all over\". On room air, denies shortness of breath or productive cough. Review of Systems  Constitutional: negative for fever, chills, sweats  Cardiovascular: bilateral LE edema, negative for chest pain, palpitations, syncope  Gastrointestinal: negative for dysphagia, reflux, vomiting, diarrhea, abdominal pain, or melena  Neurologic: negative for focal weakness, numbness, headache    MEDS SCHEDULED:    Current Facility-Administered Medications   Medication Dose Route Frequency    amiodarone (CORDARONE) tablet 200 mg  200 mg Oral DAILY    aspirin chewable tablet 81 mg  81 mg Oral DAILY    lisinopriL (PRINIVIL, ZESTRIL) tablet 10 mg  10 mg Oral DAILY    metoprolol succinate (TOPROL-XL) XL tablet 25 mg  25 mg Oral DAILY    spironolactone (ALDACTONE) tablet 25 mg  25 mg Oral DAILY    clindamycin (CLEOCIN) 900mg D5W 50mL IVPB (premix)  900 mg IntraVENous Q8H    ondansetron (ZOFRAN) injection 4 mg  4 mg IntraVENous Q6H PRN    acetaminophen (TYLENOL) tablet 650 mg  650 mg Oral Q6H PRN    furosemide (LASIX) injection 40 mg  40 mg IntraVENous BID    heparin (porcine) injection 5,000 Units  5,000 Units SubCUTAneous Q8H    alcohol 62% (NOZIN) nasal  1 Ampule  1 Ampule Topical Q12H       Objective:     Vitals:    06/21/20 2044 06/22/20 0032 06/22/20 0442 06/22/20 0814   BP: 95/60 97/63 112/84 109/71   Pulse: 99 80 92 96   Resp: 18 20 18 20   Temp: 98.6 °F (37 °C) 97 °F (36.1 °C) 98.1 °F (36.7 °C) 97.4 °F (36.3 °C)   SpO2: 100% 97% 96% 98%   Weight:   217 lb 1.6 oz (98.5 kg)    Height:         No intake/output data recorded.   06/20 1901 - 06/22 0700  In: 50 [I.V.:50]  Out: 1675 [Urine:1675]      PHYSICAL EXAM     Physical Exam:   Constitution:  the patient is well developed and in no acute distress, room air sat 98%  EENMT:  Sclera clear, pupils equal, oral mucosa moist  Respiratory: few anterior crackles, diminished in bases, no wheezing, no productive cough  Cardiovascular:  RRR without M,G,R  Gastrointestinal: soft and non-tender; with positive bowel sounds. Musculoskeletal: warm without cyanosis. There is bilateral 2+ lower extremity edema. Skin:  Redness of lower legs, no jaundice or rashes  Neurologic: no gross neuro deficits     Psychiatric:  alert and oriented x 2    CHEST X-RAYS:  6/20/20        CULTURES:  Results     Procedure Component Value Units Date/Time    MSSA/MRSA SC BY PCR, NASAL SWAB [[id number]]  (Abnormal) Collected:  06/21/20 0322    Order Status:  Completed Specimen:  Nasal swab Updated:  06/21/20 0609     Special Requests: NO SPECIAL REQUESTS        Culture result:       MRSA target DNA detected, SA target DNA detected. A positive test result does not necessarily indicate the presence of viable organisms. It is however, presumptive for the presence of MRSA or SA.                  RESULTS VERIFIED, PHONED TO AND READ BACK BY  ANNIE AT [place] [id number] ON 6.21.20        CULTURE, BLOOD [[id number]] Collected:  06/21/20 0309    Order Status:  Completed Specimen:  Blood Updated:  06/22/20 0639     Special Requests: --        RIGHT  FOREARM       Culture result: NO GROWTH 1 DAY       CULTURE, BLOOD [[id number]] Collected:  06/21/20 0302    Order Status:  Completed Specimen:  Blood Updated:  06/22/20 0639     Special Requests: --        LEFT  ARM       Culture result: NO GROWTH 1 DAY                LABS    Recent Labs     06/20/20  2309   WBC 7.2   HGB 13.2*   HCT 42.9        Recent Labs     06/22/20  0425 06/20/20  2309    140   K 3.1* 3.7    105   GLU 77 95   CO2 25 27   BUN 21 15   CREA 1.18 1.22   MG 1.7* 1.8   INR 1.3  --      No results for input(s): PH, PCO2, PO2, HCO3 in the last 72 hours. ECHO: 3/6/2020 [place]  Result Narrative   · The left ventricular systolic function is decreased (20 - 25%). · Unable to assess left ventricular diastolic function. · There is mild concentric left ventricular hypertrophy present. · Left ventricular global hypokinesis.   · The right ventricular systolic function is normal.  · The aortic root is mildly dilated. Assessment:     Hospital Problems  Date Reviewed: 6/22/2020          Codes Class Noted POA    Medical non-compliance ICD-10-CM: Z91.19  ICD-9-CM: V15.81  6/21/2020 Yes    chronic    * (Principal) Acute systolic CHF (congestive heart failure) ([facility] ICD-10-CM: I50.21  ICD-9-CM: 428.21, 428.0  6/21/2020 Yes    Lasix per cardiology    HTN (hypertension), malignant ICD-10-CM: I10  ICD-9-CM: 401.0  6/21/2020 Yes    Chronic--controlled--SBP 90-100s    Methamphetamine abuse ([facility] ICD-10-CM: F15.10  ICD-9-CM: 305.70  6/21/2020 Yes    chronic    Tobacco abuse ICD-10-CM: Z72.0  ICD-9-CM: 305.1  6/21/2020 Yes    chronic    Peripheral edema ICD-10-CM: R60.9  ICD-9-CM: 782.3  6/21/2020 Yes    chronic    Cellulitis of abdominal wall ICD-10-CM: L03.311  ICD-9-CM: 682.2  6/21/2020 Yes    Continue Cleocin    Hepatitis C, chronic ([facility] ICD-10-CM: B18.2  ICD-9-CM: 070.54  6/21/2020 Yes    chronic    Pleural effusion ICD-10-CM: J90  ICD-9-CM: 511.9  6/21/2020 Yes    Check with US for possible thoracentesis    Cellulitis of both lower extremities ICD-10-CM: L03.115, L03.116  ICD-9-CM: 682.6  6/21/2020 Yes    Continue Cleocin          Plan:     --Will check with ultrasound for possible thoracentesis today. Currently stable and on RA.   --INR 1.3 today. --continue IS  --Antibiotics per hospitalist--Cleocin D2  --Blood cultures:  pending  --Cardiology following: On Amiodarone and Lasix. --Recorded urine output 700ml last 24 hrs  -- to assist with home needs--pt is likely homeless--?drug rehab? He seems slightly interested in rehab, but not sure of compliance?   --given his history of noncompliance, not sure what his long term goals? More than 50% of time documented was spent in face-to-face contact with the patient and in the care of the patient on the floor/unit where the patient is located.        Charlotte Patches, NP     Lungs: Decreased BS R base  Heart:  RRR with no Murmur/Rubs/Gallops    Additional Comments:  Says they tried to drain fluid at ? [address] earlier this year but only got a little. Not sure he is confusing parecentesis at [facility] on 6/18. Has hx of signing out AMA. Had net 650 ml out yesterday. I have spoken with and examined the patient. I agree with the above assessment and plan as documented.     Judie Bustamante MD

## 2020-06-22 NOTE — PROGRESS NOTES
Patient requesting AMA form to leave hospital. Dr. Krishna De La Rosa MD notified. Patient signed AMA form, form placed in patient's paper chart.

## 2020-06-23 ENCOUNTER — PATIENT OUTREACH (OUTPATIENT)
Dept: CASE MANAGEMENT | Age: 40
End: 2020-06-23

## 2020-06-24 LAB
BACTERIA SPEC CULT: NORMAL
GRAM STN SPEC: NORMAL
GRAM STN SPEC: NORMAL
SERVICE CMNT-IMP: NORMAL

## 2020-06-26 LAB
BACTERIA SPEC CULT: NORMAL
BACTERIA SPEC CULT: NORMAL
SERVICE CMNT-IMP: NORMAL
SERVICE CMNT-IMP: NORMAL

## 2020-06-26 NOTE — PROGRESS NOTES
Patient contacted regarding recent discharge and COVID-19 risk. Discussed COVID-19 related testing which was not done at this time. Test results were not done. Patient informed of results, if available? N/A    Care Transition Nurse/ Ambulatory Care Manager contacted the patient by telephone to perform post discharge assessment. Verified name and  with patient as identifiers. Patient has following risk factors of: heart failure. CTN/ACM reviewed discharge instructions, medical action plan and red flags related to discharge diagnosis. Reviewed and educated them on any new and changed medications related to discharge diagnosis. Advised obtaining a 90-day supply of all daily and as-needed medications. Education provided regarding infection prevention, and signs and symptoms of COVID-19 and when to seek medical attention with patient who verbalized understanding. Discussed exposure protocols and quarantine from [address] you at higher risk for severe illness  and given an opportunity for questions and concerns. The patient agrees to contact the COVID-19 hotline [phone] or PCP office for questions related to their healthcare. CTN/ACM provided contact information for future reference. From CDC: Are you at higher risk for severe illness?  Wash your hands often.  Avoid close contact (6 feet, which is about two arm lengths) with people who are sick.  Put distance between yourself and other people if COVID-19 is spreading in your community.  Clean and disinfect frequently touched surfaces.  Avoid all cruise travel and non-essential air travel.  Call your healthcare professional if you have concerns about COVID-19 and your underlying condition or if you are sick.     For more information on steps you can take to protect yourself, see CDC's How to Protect Yourself      Patient/family/caregiver given information for [facility] and agrees to enroll no  Patient's preferred e-mail: N/A  Patient's preferred phone number: N/A  Based on Loop alert triggers, patient will be contacted by nurse care manager for worsening symptoms: not enrolled    Plan for follow-up call in 7-14 days based on severity of symptoms and risk factors.

## 2020-07-08 ENCOUNTER — PATIENT OUTREACH (OUTPATIENT)
Dept: CASE MANAGEMENT | Age: 40
End: 2020-07-08

## 2020-07-09 NOTE — PROGRESS NOTES
Patient resolved from Transition of Care episode on 7/8/2020  Discussed COVID-19 related testing which was not done at this time. Test results were not done. Patient informed of results, if available? N/A     Patient/family has been provided the following resources and education related to COVID-19:                         Signs, symptoms and red flags related to COVID-19            CDC exposure and quarantine guidelines            [facility] exposure contact - [phone]            Contact for their local Department of Health                 Patient currently reports that the following symptoms have improved:  no symptoms. No further outreach scheduled with this CTN. Episode of Care resolved. Patient has this CTN contact information if future needs arise.

## 2020-07-22 LAB
FUNGUS CULTURE, RFCO2T: NEGATIVE
FUNGUS SMEAR, RFCO1T: NORMAL
FUNGUS SPEC CULT: NORMAL
FUNGUS STAIN, 188244: NORMAL
REFLEX TO ID, RFCO3T: NORMAL
SPECIMEN SOURCE: NORMAL
SPECIMEN SOURCE: NORMAL

## 2020-08-04 LAB
ACID FAST STN SPEC: NEGATIVE
MYCOBACTERIUM SPEC QL CULT: NEGATIVE
SPECIMEN PREPARATION: NORMAL
SPECIMEN SOURCE: NORMAL

## 2021-05-12 NOTE — ROUTINE PROCESS
Bedside and Verbal shift change report given to Fletcher Harvey RN (oncoming nurse) by self (offgoing nurse). Report included the following information SBAR, Kardex, Intake/Output, MAR, Recent Results (4) rarely moist

## 2022-03-18 PROBLEM — L03.115 CELLULITIS OF BOTH LOWER EXTREMITIES: Status: ACTIVE | Noted: 2020-06-21

## 2022-03-18 PROBLEM — B18.2 HEPATITIS C, CHRONIC (HCC): Status: ACTIVE | Noted: 2020-06-21

## 2022-03-18 PROBLEM — L03.116 CELLULITIS OF BOTH LOWER EXTREMITIES: Status: ACTIVE | Noted: 2020-06-21

## 2022-03-18 PROBLEM — L03.311 CELLULITIS OF ABDOMINAL WALL: Status: ACTIVE | Noted: 2020-06-21

## 2022-03-19 PROBLEM — I50.21 ACUTE SYSTOLIC CHF (CONGESTIVE HEART FAILURE) (HCC): Status: ACTIVE | Noted: 2020-06-21

## 2022-03-19 PROBLEM — J90 PLEURAL EFFUSION: Status: ACTIVE | Noted: 2020-06-21

## 2022-03-19 PROBLEM — Z91.199 MEDICAL NON-COMPLIANCE: Status: ACTIVE | Noted: 2020-06-21

## 2022-03-19 PROBLEM — R60.9 PERIPHERAL EDEMA: Status: ACTIVE | Noted: 2020-06-21

## 2022-03-19 PROBLEM — I10 HTN (HYPERTENSION), MALIGNANT: Status: ACTIVE | Noted: 2020-06-21

## 2022-03-19 PROBLEM — F15.10 METHAMPHETAMINE ABUSE (HCC): Status: ACTIVE | Noted: 2020-06-21

## 2022-03-20 PROBLEM — Z72.0 TOBACCO ABUSE: Status: ACTIVE | Noted: 2020-06-21

## 2023-05-10 RX ORDER — LISINOPRIL 10 MG/1
10 TABLET ORAL DAILY
COMMUNITY
Start: 2020-06-23

## 2023-05-10 RX ORDER — AMIODARONE HYDROCHLORIDE 200 MG/1
200 TABLET ORAL DAILY
COMMUNITY
Start: 2020-06-23

## 2023-05-10 RX ORDER — CLINDAMYCIN HYDROCHLORIDE 300 MG/1
300 CAPSULE ORAL 4 TIMES DAILY
COMMUNITY
Start: 2020-06-22

## 2023-05-10 RX ORDER — SPIRONOLACTONE 25 MG/1
25 TABLET ORAL DAILY
COMMUNITY
Start: 2020-06-23

## 2023-05-10 RX ORDER — FUROSEMIDE 40 MG/1
40 TABLET ORAL 2 TIMES DAILY
COMMUNITY
Start: 2020-06-22

## 2023-05-10 RX ORDER — METOPROLOL SUCCINATE 25 MG/1
25 TABLET, EXTENDED RELEASE ORAL DAILY
COMMUNITY
Start: 2020-06-23

## (undated) DEVICE — STERILE POLYISOPRENE POWDER-FREE SURGICAL GLOVES: Brand: PROTEXIS

## (undated) DEVICE — GEL MEDC ULTRASOUND 5L -- REPLACED BY 326862

## (undated) DEVICE — LUER-LOK 360°: Brand: CONNECTA, LUER-LOK

## (undated) DEVICE — KIT THORCENT 8FR L5IN POLYUR W/ 18/22/25GA NDL 3 W STPCOCK